# Patient Record
Sex: FEMALE | Race: WHITE | NOT HISPANIC OR LATINO | URBAN - METROPOLITAN AREA
[De-identification: names, ages, dates, MRNs, and addresses within clinical notes are randomized per-mention and may not be internally consistent; named-entity substitution may affect disease eponyms.]

---

## 2016-09-08 RX ORDER — LEVOTHYROXINE SODIUM 125 MCG
1 TABLET ORAL
Qty: 0 | Refills: 0 | COMMUNITY
Start: 2016-09-08

## 2017-04-30 ENCOUNTER — INPATIENT (INPATIENT)
Facility: HOSPITAL | Age: 82
LOS: 1 days | Discharge: HOME | DRG: 149 | End: 2017-05-02
Attending: INTERNAL MEDICINE | Admitting: INTERNAL MEDICINE
Payer: MEDICARE

## 2017-04-30 VITALS
TEMPERATURE: 97 F | HEART RATE: 103 BPM | RESPIRATION RATE: 18 BRPM | DIASTOLIC BLOOD PRESSURE: 84 MMHG | WEIGHT: 109.79 LBS | SYSTOLIC BLOOD PRESSURE: 150 MMHG

## 2017-04-30 DIAGNOSIS — I95.9 HYPOTENSION, UNSPECIFIED: ICD-10-CM

## 2017-04-30 DIAGNOSIS — I48.91 UNSPECIFIED ATRIAL FIBRILLATION: ICD-10-CM

## 2017-04-30 DIAGNOSIS — Z98.89 OTHER SPECIFIED POSTPROCEDURAL STATES: Chronic | ICD-10-CM

## 2017-04-30 DIAGNOSIS — E03.9 HYPOTHYROIDISM, UNSPECIFIED: ICD-10-CM

## 2017-04-30 DIAGNOSIS — M25.472 EFFUSION, LEFT ANKLE: ICD-10-CM

## 2017-04-30 DIAGNOSIS — I50.30 UNSPECIFIED DIASTOLIC (CONGESTIVE) HEART FAILURE: ICD-10-CM

## 2017-04-30 DIAGNOSIS — R42 DIZZINESS AND GIDDINESS: ICD-10-CM

## 2017-04-30 LAB
ALBUMIN SERPL ELPH-MCNC: 4 G/DL — SIGNIFICANT CHANGE UP (ref 3.4–5)
ALP SERPL-CCNC: 59 U/L — SIGNIFICANT CHANGE UP (ref 40–120)
ALT FLD-CCNC: 23 U/L — SIGNIFICANT CHANGE UP (ref 12–42)
ANION GAP SERPL CALC-SCNC: 5 MMOL/L — LOW (ref 9–16)
AST SERPL-CCNC: 20 U/L — SIGNIFICANT CHANGE UP (ref 15–37)
BASOPHILS NFR BLD AUTO: 0.4 % — SIGNIFICANT CHANGE UP (ref 0–2)
BILIRUB SERPL-MCNC: 0.4 MG/DL — SIGNIFICANT CHANGE UP (ref 0.2–1.2)
BUN SERPL-MCNC: 15 MG/DL — SIGNIFICANT CHANGE UP (ref 7–23)
CALCIUM SERPL-MCNC: 9 MG/DL — SIGNIFICANT CHANGE UP (ref 8.5–10.5)
CHLORIDE SERPL-SCNC: 104 MMOL/L — SIGNIFICANT CHANGE UP (ref 96–108)
CK MB CFR SERPL CALC: <1 NG/ML — SIGNIFICANT CHANGE UP (ref 0.5–3.6)
CK SERPL-CCNC: 36 U/L — SIGNIFICANT CHANGE UP (ref 26–192)
CLOSURE TME COLL+EPINEP BLD: 125 K/UL — LOW (ref 150–400)
CO2 SERPL-SCNC: 30 MMOL/L — SIGNIFICANT CHANGE UP (ref 22–31)
CREAT SERPL-MCNC: 0.8 MG/DL — SIGNIFICANT CHANGE UP (ref 0.5–1.3)
EOSINOPHIL NFR BLD AUTO: 0.4 % — SIGNIFICANT CHANGE UP (ref 0–6)
EXTRA BLUE TOP TUBE: SIGNIFICANT CHANGE UP
EXTRA SST TUBE: SIGNIFICANT CHANGE UP
GLUCOSE SERPL-MCNC: 105 MG/DL — HIGH (ref 70–99)
HCT VFR BLD CALC: 44.6 % — SIGNIFICANT CHANGE UP (ref 34.5–45)
HGB BLD-MCNC: 15.5 G/DL — SIGNIFICANT CHANGE UP (ref 11.5–15.5)
LYMPHOCYTES # BLD AUTO: 31 % — SIGNIFICANT CHANGE UP (ref 13–44)
MCHC RBC-ENTMCNC: 30.1 PG — SIGNIFICANT CHANGE UP (ref 27–34)
MCHC RBC-ENTMCNC: 34.8 G/DL — SIGNIFICANT CHANGE UP (ref 32–36)
MCV RBC AUTO: 86.6 FL — SIGNIFICANT CHANGE UP (ref 80–100)
MONOCYTES NFR BLD AUTO: 7.1 % — SIGNIFICANT CHANGE UP (ref 2–14)
NEUTROPHILS NFR BLD AUTO: 61.1 % — SIGNIFICANT CHANGE UP (ref 43–77)
NT-PROBNP SERPL-SCNC: 632 PG/ML — HIGH
PLATELET # BLD AUTO: 23 K/UL — LOW (ref 150–400)
POTASSIUM SERPL-MCNC: 4.3 MMOL/L — SIGNIFICANT CHANGE UP (ref 3.5–5.3)
POTASSIUM SERPL-SCNC: 4.3 MMOL/L — SIGNIFICANT CHANGE UP (ref 3.5–5.3)
PROT SERPL-MCNC: 8.1 G/DL — SIGNIFICANT CHANGE UP (ref 6.4–8.2)
RBC # BLD: 5.15 M/UL — SIGNIFICANT CHANGE UP (ref 3.8–5.2)
RBC # FLD: 13.5 % — SIGNIFICANT CHANGE UP (ref 10.3–16.9)
SODIUM SERPL-SCNC: 139 MMOL/L — SIGNIFICANT CHANGE UP (ref 135–145)
TROPONIN I SERPL-MCNC: 0.12 NG/ML — HIGH (ref 0.01–0.04)
TROPONIN I SERPL-MCNC: 0.12 NG/ML — HIGH (ref 0.01–0.04)
WBC # BLD: 4.7 K/UL — SIGNIFICANT CHANGE UP (ref 3.8–10.5)
WBC # FLD AUTO: 4.7 K/UL — SIGNIFICANT CHANGE UP (ref 3.8–10.5)

## 2017-04-30 PROCEDURE — 71010: CPT | Mod: 26

## 2017-04-30 PROCEDURE — 99285 EMERGENCY DEPT VISIT HI MDM: CPT | Mod: 25

## 2017-04-30 PROCEDURE — 70450 CT HEAD/BRAIN W/O DYE: CPT | Mod: 26

## 2017-04-30 PROCEDURE — 93010 ELECTROCARDIOGRAM REPORT: CPT

## 2017-04-30 RX ORDER — HEPARIN SODIUM 5000 [USP'U]/ML
5000 INJECTION INTRAVENOUS; SUBCUTANEOUS EVERY 8 HOURS
Qty: 0 | Refills: 0 | Status: DISCONTINUED | OUTPATIENT
Start: 2017-04-30 | End: 2017-04-30

## 2017-04-30 RX ORDER — SODIUM CHLORIDE 9 MG/ML
1000 INJECTION INTRAMUSCULAR; INTRAVENOUS; SUBCUTANEOUS
Qty: 0 | Refills: 0 | Status: DISCONTINUED | OUTPATIENT
Start: 2017-04-30 | End: 2017-04-30

## 2017-04-30 RX ORDER — ESTROGENS, CONJUGATED 0.625 MG
0.3 TABLET ORAL DAILY
Qty: 0 | Refills: 0 | Status: DISCONTINUED | OUTPATIENT
Start: 2017-04-30 | End: 2017-05-02

## 2017-04-30 RX ORDER — LEVOTHYROXINE SODIUM 125 MCG
25 TABLET ORAL DAILY
Qty: 0 | Refills: 0 | Status: DISCONTINUED | OUTPATIENT
Start: 2017-04-30 | End: 2017-05-01

## 2017-04-30 RX ORDER — APIXABAN 2.5 MG/1
2.5 TABLET, FILM COATED ORAL
Qty: 0 | Refills: 0 | Status: DISCONTINUED | OUTPATIENT
Start: 2017-04-30 | End: 2017-05-02

## 2017-04-30 RX ORDER — ONDANSETRON 8 MG/1
4 TABLET, FILM COATED ORAL ONCE
Qty: 0 | Refills: 0 | Status: COMPLETED | OUTPATIENT
Start: 2017-04-30 | End: 2017-04-30

## 2017-04-30 RX ADMIN — APIXABAN 2.5 MILLIGRAM(S): 2.5 TABLET, FILM COATED ORAL at 22:54

## 2017-04-30 RX ADMIN — ONDANSETRON 4 MILLIGRAM(S): 8 TABLET, FILM COATED ORAL at 16:36

## 2017-04-30 RX ADMIN — Medication 0.3 MILLIGRAM(S): at 22:54

## 2017-04-30 RX ADMIN — SODIUM CHLORIDE 200 MILLILITER(S): 9 INJECTION INTRAMUSCULAR; INTRAVENOUS; SUBCUTANEOUS at 16:36

## 2017-04-30 NOTE — H&P ADULT - NSHPLABSRESULTS_GEN_ALL_CORE
Temp 98.6F, HR 73bpm, /60, RR 16, O2 sat 96% RA    Vital Signs Last 24 Hrs  T(C): 36.6, Max: 36.6 (04-30 @ 18:58)  T(F): 97.8, Max: 97.8 (04-30 @ 18:58)  HR: 73 (73 - 103)  BP: 123/60 (123/60 - 168/75)  BP(mean): --  RR: 15 (15 - 18)  SpO2: 96% (96% - 97%)                          15.5   4.7   )-----------( 23       ( 30 Apr 2017 16:33 )             44.6       04-30    139  |  104  |  15  ----------------------------<  105<H>  4.3   |  30  |  0.80    Ca    9.0      30 Apr 2017 16:33    TPro  8.1  /  Alb  4.0  /  TBili  0.4  /  DBili  x   /  AST  20  /  ALT  23  /  AlkPhos  59  04-30          CARDIAC MARKERS ( 30 Apr 2017 16:33 )  0.115 ng/mL / x     / 36 U/L / x     / <1.0 ng/mL

## 2017-04-30 NOTE — H&P ADULT - PROBLEM SELECTOR PLAN 2
-Currently in sinus rhythm, HR 70s-80s  -Previously on metoprolol XL 25mg daily, per patient it was discontinued  -[facility] [phone] closed Sunday, follow up confirmation of meds  -s/p DCCV failed, but returned to sinus rhythm with amiodarone therapy afterwards  -Continue Eliquis 2.5mg BID

## 2017-04-30 NOTE — ED ADULT TRIAGE NOTE - CHIEF COMPLAINT QUOTE
2 syncope over past few days - given medicine over phone for vertigo - not helping - associated nausea ; states continues to feel dizzy denies pain or SOB

## 2017-04-30 NOTE — ED PROVIDER NOTE - ATTENDING CONTRIBUTION TO CARE
87 y/o female h/o  htn, afib and chf, hyperlipidemia, hypothyroid c/o dizziness (room spinning) x 1 wk.  No relief w meclizine  No associated ha, cp, sob, n/v/d, syncope,  visual changes, numbness/weakness in ext,  fevers, chills.  Anxious, nc/at, perrl, eomi, lung cta, heart reg, abd soft/nt, ext no c/c/e, cn grossly intact, motor 5/5, no gross sens deficits, nl gait.  Suspect vertigo, no cp to suggest acs.  EKG w/o stemi.  Pt felt improved w valium.  Labs w elevated trop, unchanged head ct; pt discussed w cardiology - will admit for r/o.

## 2017-04-30 NOTE — H&P ADULT - PROBLEM SELECTOR PLAN 1
-Pt appears dizzier with ambulation  -Fall precautions, OOB w/ assist only  -Troponin 0.115, f/u trop @ 10 and in AM  - echo in AM, carotid US ordered for AM- discuss with Dr Toledo in AM  -EKG NSR no acute changes  -CT head negative for acute pathology

## 2017-04-30 NOTE — H&P ADULT - PROBLEM SELECTOR PLAN 3
-Mild rales on exam, O2 97% on RA, CXR-cardiomegaly neg for pulmonary congestion  -TRIXIE (9/2016)EF 55%, normal LV motion, mild-moderate AR/ MR, moderate atherosclerotic plaque in descending aorta.  - F/u Echo in AM -Mild rales on exam, O2 97% on RA, CXR-cardiomegaly neg for pulmonary congestion  -TRIXIE (9/2016)EF 55%, normal LV motion, mild-moderate AR/ MR, moderate atherosclerotic plaque in descending aorta.  - F/u Echo in AM  -

## 2017-04-30 NOTE — H&P ADULT - HISTORY OF PRESENT ILLNESS
85 year old female, occasional marijuana user,  with pmHx afib (on Eliquis, s/p DCCV with amiodarone therapy afterwards), hypothyroidism, hypotension, vasovagal syncope, mitral valve repair in 2004, recently hospitalized @ [facility] (September 2016) for acute decompensated diastolic heart failure at which time patient was started on amiodarone loading with plans to bring patient back for DCCV within one week, however patient reports with medication she returned to sinus rhythm presented to ED today (4/30/17) complaining of increasing dizziness and palpitations for the last week. Pt reports she saw her cardiologist, Dr Mejias, this Wednesday for similar symptoms and was found to be dehydrated with a normal EKG and was sent home. Pt states she went home that night to eat dinner and as she bent over to eat dinner felt weak and dizzy with the room spinning and slowly lowered herself to the floor with no LOC or head trauma. Pt called, Dr Mejias the next day  Dr Mejias, who called in prescription meclizine that the patient took with no relief. In ED, /80, , T 97.2, Platelets 23 (called for manual count, will follow), RR 18, O2..?, Trop 0.1, EKG    Zofran, valium, CT head (4/30/17) No appreciable interval change.No hydrocephalus, midline shift, acute intracranial hemorrhage or demarcated territorial infarct.   TRIXIE (9/2016): EF 55%, normal LV motion, mild-moderate AR/ MR, moderate atherosclerotic plaque in descending aorta.  MRA chest(9/16/2016) No aortic aneurysm or dissection.Cardiomegaly. Status post mitral valve replacement. Small to moderate-sized bilateral pleural effusions. 85 year old female, occasional marijuana user,  with pmHx afib (on Eliquis, s/p DCCV with amiodarone therapy afterwards), hypothyroidism, hypotension, vasovagal syncope, mitral valve repair in 2004, recently hospitalized @ [facility] (September 2016) for acute decompensated diastolic heart failure at which time patient was started on amiodarone loading with plans to bring patient back for DCCV within one week, however patient reports with medication she returned to sinus rhythm presented to ED today (4/30/17) complaining of increasing dizziness and palpitations for the last week. Pt reports she saw her cardiologist, Dr Mejias, this Wednesday for similar symptoms and was found to be dehydrated with a normal EKG and was sent home. Pt states she went home that night to eat dinner and as she bent over to eat dinner felt weak and dizzy with the room spinning and slowly lowered herself to the floor with no LOC or head trauma. Pt called, Dr Mejias the next day and she called in meclizine for possible vertigo symptoms and patient still felt unsteady and weak when getting out of bed yesterday with associated nausea. Today she spoke with Dr Mejias's covering physician who told her to go to the ER to be further evaluated. Pt admits to SOB when walking 3 blocks, has a chronically left swollen ankle that has been evaluated by ultrasound. In ED, /80, , T 97.2, Platelets 23 (called for manual count, will follow), RR 18, O2 97% on RA, Trop 0.1, EKG NSR, no acute changes.  Zofran 4mg x1, valium 2mg x 1, CT head (4/30/17) No appreciable interval change. No hydrocephalus, midline shift, acute intracranial hemorrhage or demarcated territorial infarct. Denies CP, palpitations, vomiting, diarrhea, recent fevers/chills, melena, hematochezia.   TRIXIE (9/2016): EF 55%, normal LV motion, mild-moderate AR/ MR, moderate atherosclerotic plaque in descending aorta.  MRA chest(9/16/2016) No aortic aneurysm or dissection.Cardiomegaly. Status post mitral valve replacement. Small to moderate-sized bilateral pleural effusions. 85 year old female, occasional marijuana user,  with pmHx afib (on Eliquis, s/p DCCV with amiodarone therapy afterwards), hypothyroidism, hypotension, vasovagal syncope, mitral valve repair in 2004, recently hospitalized @ [facility] (September 2016) for acute decompensated diastolic heart failure at which time patient was started on amiodarone loading with plans to bring patient back for DCCV within one week, however patient reports with medication she returned to sinus rhythm presented to ED today (4/30/17) complaining of increasing dizziness and palpitations for the last week. Pt reports she saw her cardiologist, Dr Mejias, this Wednesday for similar symptoms and was found to be dehydrated with a normal EKG and was sent home. Pt states she went home that night to eat dinner and as she bent over to eat dinner felt weak and dizzy with the room spinning and slowly lowered herself to the floor with no LOC or head trauma. Pt called, Dr Mejias the next day and she called in meclizine for possible vertigo symptoms and patient still felt unsteady and weak when getting out of bed yesterday with associated nausea. Today she spoke with Dr Mejias's covering physician who told her to go to the ER to be further evaluated. Pt admits to SOB when walking 3 blocks, has a chronically left swollen ankle that has been evaluated by ultrasound. In ED, /80, , T 97.2, Platelets 23 (called for manual count, will follow), RR 18, O2 97% on RA, CXR cardiomegaly no pulmonary congestion, Trop 0.1, EKG NSR, no acute changes.  Zofran 4mg x1, valium 2mg x 1, CT head (4/30/17) No appreciable interval change. No hydrocephalus, midline shift, acute intracranial hemorrhage or demarcated territorial infarct. Denies CP, palpitations, vomiting, diarrhea, recent fevers/chills, melena, hematochezia. Pt is admitted to [facility] for further cardiac management  TRIXIE (9/2016): EF 55%, normal LV motion, mild-moderate AR/ MR, moderate atherosclerotic plaque in descending aorta.  MRA chest(9/16/2016) No aortic aneurysm or dissection.Cardiomegaly. Status post mitral valve replacement. Small to moderate-sized bilateral pleural effusions.

## 2017-04-30 NOTE — ED ADULT NURSE NOTE - OBJECTIVE STATEMENT
pt to ER w/ report of several recent near-syncopal episodes, most recently last night.  Pt denies LOC, denies cp/sob/f/c.  Pt reports intermittent dizziness, nausea, and disequilibrium.  Breathing unlabored, skin warm and dry. IV access established, labs drawn and sent. IV fluids and meds given per PA order.  Will continue to monitor.

## 2017-04-30 NOTE — H&P ADULT - PROBLEM SELECTOR PLAN 6
-Pt reports chronic swelling in left ankle for years, states she has had ultrasounds on it, denies pain or increase in swelling recently.

## 2017-04-30 NOTE — ED PROVIDER NOTE - OBJECTIVE STATEMENT
The pt is a 87 y/o F, who presents to ED c/o feeling dizzy x 1 wk. Pt states that "everything is spinning", is afraid that will fall, has been taking meclizine w/o relief. Denies cp, sob, n/v/d, syncope, h/a, visual changes, fevers, chills The pt is a 87 y/o F, who presents to ED c/o feeling dizzy x 1 wk. Pt states that "everything is spinning", is afraid that will fall, has been taking meclizine w/o relief, dizziness not related to position changes and feels "like I may pass out". PMH of htn, afib and chf, hyperlipidemia, hypothyroid. Denies cp, sob, n/v/d, syncope, h/a, visual changes, fevers, chills

## 2017-04-30 NOTE — ED PROVIDER NOTE - CARE PLAN
Principal Discharge DX:	Elevated troponin  Secondary Diagnosis:	Dizziness  Secondary Diagnosis:	A-fib

## 2017-04-30 NOTE — ED PROVIDER NOTE - ENMT, MLM
Airway patent, Nasal mucosa clear. Mouth with normal mucosa. Throat has no vesicles, no oropharyngeal exudates and uvula is midline. ears clear b/l

## 2017-04-30 NOTE — H&P ADULT - NSHPPHYSICALEXAM_GEN_ALL_CORE
Appearance:  Anxious  Neck: Supple,  - JVD; No Carotid Bruit and 2+ pulses B/L  Cardiovascular: + Mitral murmur, Normal S1 S2, No JVD  Respiratory: + rhonchi mid to bases b/l, No  Rhonchi, Wheezing	  Gastrointestinal:  Soft, Non-tender, + BS  Skin: No rashes, No ecchymoses, No cyanosis  Extremities: +chronic left ankle swelling,  Normal range of motion, No clubbing, cyanosis or edema  Vascular: Femoral pulses 2+ b/l without bruit, DP 2+ b/l, PT faint b/l  Neurologic: Non-focal  Psychiatry: A & O x 3, Mood & affect appropriate Appearance:  Anxious  Neck: Supple,  - JVD; No Carotid Bruit and 2+ pulses B/L  Cardiovascular: + Mitral murmur, Normal S1 S2, No JVD  Respiratory: + rhonchi mid to bases b/l, No  Rhonchi, Wheezing	  Gastrointestinal:  Soft, Non-tender, + BS  Skin: No rashes, No ecchymoses, No cyanosis  Extremities: +chronic left ankle swelling +1 nonpitting,  Normal range of motion, No clubbing, cyanosis or edema  Vascular: Femoral pulses 2+ b/l without bruit, DP 2+ b/l, PT faint b/l  Neurologic: Non-focal  Psychiatry: A & O x 3, Mood & affect appropriate

## 2017-04-30 NOTE — ED PROVIDER NOTE - MEDICAL DECISION MAKING DETAILS
pt w/dizziness x 1 wk, feels like may pass out, hx of afib / chf /htn/ hypothyroid, has not f/u w/cards in "a while", ekg nsr w/pvcs, however + trop, non focal neuro exam, ct head neg, cxr clear, will admit to cards tele for echo and serial trop / r/o arrhythmia r/o acs, pt hemodynamically stable, did have relief of dizziness w/dose of valium in ed

## 2017-04-30 NOTE — H&P ADULT - ATTENDING COMMENTS
PATIENT SEEN AND EXAMINED.  AGREE WITH ABOVE.  CONFIRMED WITH PATIENT.  IN ADDITION, SHE HAS HAD SYNCOPE 4-5 TIMES IN THE PAST MANY YEARS AND HAS HER CARDIOLOGIST AT [facility] THAT FOLLOWS HER.  OF NOTE, SHE HAD A LOW BP THIS PAST WEDNESDAY AND WAS TOLD TO INCREASE SALT AND ANY MEDS THAT LOWERED BP WERE STOPPED.  THERE IS ALSO SOME CONCERN THAT SHE HAS VERTIGO AND WAS SEEN BY AN OUT PATIENT ENT.      FEELS BETTER THIS MORNING.      EKG: NSR, NO ISCHEMIA  L: PLAT 32 (MANUAL COUTN 123)    IMPRESSION:  - DIZZY  - AFIB  - LOW PLATS    REC:  - HOLD ALL ANTI-HTN  - EP EVAL, CONSIDER ILR  - HEME EVAL FOR LOW PLATS  - PT/SW EVAL - PLAN D/C TOMORROW

## 2017-05-01 ENCOUNTER — TRANSCRIPTION ENCOUNTER (OUTPATIENT)
Age: 82
End: 2017-05-01

## 2017-05-01 DIAGNOSIS — D69.6 THROMBOCYTOPENIA, UNSPECIFIED: ICD-10-CM

## 2017-05-01 PROCEDURE — 99223 1ST HOSP IP/OBS HIGH 75: CPT

## 2017-05-01 RX ORDER — ACETAMINOPHEN 500 MG
650 TABLET ORAL ONCE
Qty: 0 | Refills: 0 | Status: COMPLETED | OUTPATIENT
Start: 2017-05-01 | End: 2017-05-01

## 2017-05-01 RX ORDER — ZALEPLON 10 MG
5 CAPSULE ORAL AT BEDTIME
Qty: 0 | Refills: 0 | Status: DISCONTINUED | OUTPATIENT
Start: 2017-05-01 | End: 2017-05-02

## 2017-05-01 RX ORDER — LEVOTHYROXINE SODIUM 125 MCG
50 TABLET ORAL DAILY
Qty: 0 | Refills: 0 | Status: DISCONTINUED | OUTPATIENT
Start: 2017-05-01 | End: 2017-05-02

## 2017-05-01 RX ORDER — POTASSIUM CHLORIDE 20 MEQ
20 PACKET (EA) ORAL ONCE
Qty: 0 | Refills: 0 | Status: COMPLETED | OUTPATIENT
Start: 2017-05-01 | End: 2017-05-01

## 2017-05-01 RX ADMIN — Medication 650 MILLIGRAM(S): at 02:17

## 2017-05-01 RX ADMIN — Medication 25 MICROGRAM(S): at 05:17

## 2017-05-01 RX ADMIN — Medication 20 MILLIEQUIVALENT(S): at 08:42

## 2017-05-01 RX ADMIN — APIXABAN 2.5 MILLIGRAM(S): 2.5 TABLET, FILM COATED ORAL at 05:18

## 2017-05-01 RX ADMIN — Medication 5 MILLIGRAM(S): at 22:59

## 2017-05-01 RX ADMIN — Medication 0.3 MILLIGRAM(S): at 21:20

## 2017-05-01 RX ADMIN — Medication 650 MILLIGRAM(S): at 03:17

## 2017-05-01 RX ADMIN — APIXABAN 2.5 MILLIGRAM(S): 2.5 TABLET, FILM COATED ORAL at 21:20

## 2017-05-01 NOTE — PROGRESS NOTE ADULT - PROBLEM SELECTOR PLAN 2
Currently in NSR  71 bpm on telemetry.   - EP consult (Dr. Menchaca following) recommended loop recorder/amio/BB, patient refusing all  - Per EP, c/w Eliquis 2.5mg BID as stroke risk outweighing risk of bleeding with low platelet count.

## 2017-05-01 NOTE — DISCHARGE NOTE ADULT - CARE PROVIDER_API CALL
alissa ochoa  Address: [address] [place] [address]  Phone: [phone]  Phone: (   )    -  Fax: (   )    -    Eyad Menchaca), Cardiac Electrophysiology; Cardiovascular Disease; Internal Medicine  [address] [address]  Phone: [phone]  Fax: [phone]    Mitzi Kyle), Medicine  [address] [place] [address]  Phone: [phone]  Fax: [phone] Eyad Menchaca), Cardiac Electrophysiology; Cardiovascular Disease; Internal Medicine  [address] [address]  Phone: [phone]  Fax: [phone]    Mitzi Kyle), Medicine  [address] [place] [address]  Phone: [phone]  Fax: [phone]    goldberg, neica  Address: [address] [place] [address]  Phone: [phone]  Phone: (   )    -  Fax: (   )    -

## 2017-05-01 NOTE — DISCHARGE NOTE ADULT - PLAN OF CARE
You came to the hospital with dizziness and had a CT scan of your head which was negative as was the Echocardiogram of your heart and Ultrasound of your Carotid Arteries in your neck. Follow up with your Cardiologist Dr. Mejias at [facility] in 1-2 weeks. You have an irregular heart rhythm and need to continue taking your Eliquis as prescribed 2.5mg twice a day. Follow up with your Cardiologist Dr. Mejias at [facility] in 1-2 weeks as well as Dr. Menchaca [address], [address] [phone]. Continue your Synthroid 50mcg daily You have a low platelet count and Dr. Kyle would like to see you in her office in 1 week to follow up. Dr. Kyle office is at [address] [place] [address] 3rd floor ([phone]) Follow up with your Cardiologist Dr. Dr. Goldberg at [facility] in 1-2 weeks. Follow up with your Cardiologist Dr. Goldberg at [facility] in 1-2 weeks as well as Dr. Menchaca [address] [phone].

## 2017-05-01 NOTE — PROGRESS NOTE ADULT - ASSESSMENT
84yo F, occasional marijuana user, with PMHx afib (on Eliquis, s/p failed DCCV converted to NSR with amiodarone therapy afterwards), hypothyroidism, hypotension, vasovagal syncope, mitral valve repair in 2004, diastolic CHF (EF 55%, recent hospitalization 9/16, mild-moderate AR/MR) who presented to ED (4/30/17) complaining of increasing dizziness and palpitations for the last week. Pt is admitted to [facility] for further cardiac management.

## 2017-05-01 NOTE — DISCHARGE NOTE ADULT - MEDICATION SUMMARY - MEDICATIONS TO STOP TAKING
I will STOP taking the medications listed below when I get home from the hospital:    amiodarone 200 mg oral tablet  -- 1 tab(s) by mouth every 12 hours, until september 17. After that please take one tablet daily.    Toprol-XL 25 mg oral tablet, extended release  -- 1 tab(s) by mouth once a day (at bedtime.  -- It is very important that you take or use this exactly as directed.  Do not skip doses or discontinue unless directed by your doctor.  May cause drowsiness.  Alcohol may intensify this effect.  Use care when operating dangerous machinery.  Some non-prescription drugs may aggravate your condition.  Read all labels carefully.  If a warning appears, check with your doctor before taking.  Swallow whole.  Do not crush.  Take with food or milk.  This drug may impair the ability to drive or operate machinery.  Use care until you become familiar with its effects.    furosemide 20 mg oral tablet  -- 1 tab(s) by mouth once a day in the morning  -- Avoid prolonged or excessive exposure to direct and/or artificial sunlight while taking this medication.  It is very important that you take or use this exactly as directed.  Do not skip doses or discontinue unless directed by your doctor.  It may be advisable to drink a full glass orange juice or eat a banana daily while taking this medication.

## 2017-05-01 NOTE — DISCHARGE NOTE ADULT - PROVIDER TOKENS
FREE:[LAST:[gabriela],FIRST:[alissa],PHONE:[(   )    -],FAX:[(   )    -],ADDRESS:[Address: [address] [place] [address]  Phone: [phone]]],TOKEN:'[id number]',TOKEN:'[id number]' TOKEN:'[id number]:'[id number]',FREE:[LAST:[goldberg],FIRST:[tanvir],PHONE:[(   )    -],FAX:[(   )    -],ADDRESS:[Address: [address] [place] [address]  Phone: [phone]]]

## 2017-05-01 NOTE — PROGRESS NOTE ADULT - PROBLEM SELECTOR PLAN 1
Denies current dizziness, No events on telemetry overnight.   - Head CT negative for pathology.   - Troponin trend 0.115->0.121->0.129  - follow up echo  - follow up b/l carotid duplex  - PT eval considering age

## 2017-05-01 NOTE — DISCHARGE NOTE ADULT - OTHER SIGNIFICANT FINDINGS
patient seen and examined.  agree with above. EP input appreciated - recommend eliquis.  heme follow up for thrombocytopenia.    out patient follow up with primary cardiology

## 2017-05-01 NOTE — CONSULT NOTE ADULT - ASSESSMENT
84 y/o F with pmHx hypothyroidism, hypotension, vasovagal syncope, mitral valve repair in 2004, AFIB with rapid ventricular response episode in 9/2016 treated with cardioversion. She is here for weakness / near-syncope last week.  - She has had extensive cardiac monitor in Feb that had unremarkable finding despite her persistent symptoms.  Unclear if presenting symptoms this time is arrhythmic related vs possible hypotension. Current telemetry while here continues to NSR.  She is reluctant to consider any loop recorder implant. No further intervention from EPS standpoint. Continue Eliquis for stroke prevention. Will d/w Dr. Menchaca.

## 2017-05-01 NOTE — DISCHARGE NOTE ADULT - MEDICATION SUMMARY - MEDICATIONS TO TAKE
I will START or STAY ON the medications listed below when I get home from the hospital:    Eliquis 2.5 mg oral tablet  -- 1 tab(s) by mouth 2 times a day  -- Indication: For A-fib    flurazepam 15 mg oral capsule  -- 1 cap(s) by mouth once a day (at bedtime)  -- Indication: For Anxiety    Premarin 0.3 mg oral tablet  -- 1 tab(s) by mouth once a day  -- Indication: For Estrogen    levothyroxine 25 mcg (0.025 mg) oral tablet  -- 1 tab(s) by mouth 2 times a day  -- Indication: For Hypothyroidism

## 2017-05-01 NOTE — DISCHARGE NOTE ADULT - PATIENT PORTAL LINK FT
“You can access the [facility] Patient Portal, offered by [facility], by registering with the following website: [email or web address]”

## 2017-05-01 NOTE — CONSULT NOTE ADULT - SUBJECTIVE AND OBJECTIVE BOX
HISTORY OF PRESENT ILLNESS:   85 year old female, occasional marijuana user,  with pmHx afib (on Eliquis, s/p DCCV with amiodarone therapy afterwards), hypothyroidism, hypotension, vasovagal syncope, mitral valve repair in 2004, AFIB with rapid ventricular response episode in 9/2016 with presentation of near-syncope, weakness and malaise. She was treated with rate slowing and anticoagulation. She underwent TRIXIE without LA/JINA thrombus, given Amiodarone and had DCCV. She was on multiple medicatiosn after the cardioversion including metoprolol, lasix, amiodarone and eliquis. She has discontinued all but the eliquis as she has complained of intolerance to all the drugs with complaints of GI di(a chronic problem)    recently hospitalized @ [facility] (September 2016) for acute decompensated diastolic heart failure at which time patient was started on amiodarone loading with plans to bring patient back for DCCV within one week, however patient reports with medication she returned to sinus rhythm presented to ED today (4/30/17) complaining of increasing dizziness and palpitations for the last week. Pt reports she saw her cardiologist, Dr Mejias, this Wednesday for similar symptoms and was found to be dehydrated with a normal EKG and was sent home. Pt states she went home that night to eat dinner and as she bent over to eat dinner felt weak and dizzy with the room spinning and slowly lowered herself to the floor with no LOC or head trauma. Pt called, Dr Mejias the next day and [facility] and patient still felt unsteady and weak when getting out of bed yesterday with associated nausea. Today she spoke with Dr Mejias's covering physician who told her to go to the ER to be further evaluated. Pt admits to SOB when walking 3 blocks, has a chronically left swollen ankle that has been evaluated by ultrasound. In ED, /80, , T 97.2, Platelets 23 (called for manual count, will follow), RR 18, O2 97% on RA, CXR cardiomegaly no pulmonary congestion, Trop 0.1, EKG NSR, no acute changes.  Zofran 4mg x1, valium 2mg x 1, CT head (4/30/17) No appreciable interval change. No hydrocephalus, midline shift, acute intracranial hemorrhage or demarcated territorial infarct. Denies CP, palpitations, vomiting, diarrhea, recent fevers/chills, melena, hematochezia. Pt is [email or web address] for further cardiac management  TRIXIE (9/2016): EF 55%, normal LV motion, mild-moderate AR/ MR, moderate atherosclerotic plaque in descending aorta.  MRA chest(9/16/2016) No aortic aneurysm or dissection.Cardiomegaly. Status post mitral valve replacement. Small to moderate-sized bilateral pleural effusions. (30 Apr 2017 20:26)          PAST MEDICAL AND SURGICAL HISTORY:  PAST MEDICAL & SURGICAL HISTORY:  Vasovagal syncope  Migraines  Hypotension  Hypothyroidism  A-fib  H/O mitral valve repair      FAMILY HISTORY: FAMILY HISTORY:      SOCIAL HISTORY: Occupation:  [employer]: Denied  Smoking: Denied  Drug Use: Denied  Marital Status:         REVIEW OF SYSTEM: REVIEW OF SYSTEMS:    CONSTITUTIONAL: No fever, weight loss, or fatigue  EYES: No eye pain, visual disturbances, or discharge  ENMT:  No difficulty hearing, tinnitus, vertigo; No sinus or throat pain  NECK: No pain or stiffness  BREASTS: No pain, masses, or nipple discharge  RESPIRATORY: No cough, wheezing, chills or hemoptysis; No shortness of breath  CARDIOVASCULAR: No chest pain, palpitations, dizziness, or leg swelling  GASTROINTESTINAL: No abdominal or epigastric pain. No nausea, vomiting, or hematemesis; No diarrhea or constipation. No melena or hematochezia.  GENITOURINARY: No dysuria, frequency, hematuria, or incontinence  NEUROLOGICAL: No headaches, memory loss, loss of strength, numbness, or tremors  SKIN: No itching, burning, rashes, or lesions   LYMPH NODES: No enlarged glands  ENDOCRINE: No heat or cold intolerance; No hair loss  MUSCULOSKELETAL: No joint pain or swelling; No muscle, back, or extremity pain  PSYCHIATRIC: No depression, anxiety, mood swings, or difficulty sleeping  HEME/LYMPH: No easy bruising, or bleeding gums  ALLERGY AND IMMUNOLOGIC: No hives or eczema      ALLERGY:  latex (Other)  Orange Juice (Other)  penicillin (Angioedema)      INPATIENT MEDICATIONS:  apixaban 2.5milliGRAM(s) Oral two times a day  levothyroxine 25MICROGram(s) Oral daily  estrogens    conjugated 0.3milliGRAM(s) Oral daily      VITAL SIGNS:   T(C): 36.1, Max: 36.6 (04-30 @ 18:58)  HR: 54 (54 - 103)  BP: 133/64 (113/53 - 168/75)  RR: 16 (15 - 18)  SpO2: 98% (96% - 98%)  Wt(kg): --    PHYSICAL EXAM:   Appearance:   CVS:   Pulm:   Abd:  Soft, NT/ND, + BS	  Ext: No edema    LABS:                        14.6   4.0   )-----------( x        ( 01 May 2017 11:04 )             43.7     05-01    141  |  106  |  15  ----------------------------<  77  3.8   |  26  |  0.77    Ca    8.0<L>      01 May 2017 06:32  Mg     2.0     05-01    TPro  8.1  /  Alb  4.0  /  TBili  0.4  /  DBili  x   /  AST  20  /  ALT  23  /  AlkPhos  59  04-30      TSH  TroponinTroponin I, Serum: 0.129 ng/mL (05-01 @ 06:32)  Troponin I, Serum: 0.121 ng/mL (04-30 @ 20:25)  Troponin I, Serum: 0.115 ng/mL (04-30 @ 16:33)     LIVER FUNCTIONS - ( 30 Apr 2017 16:33 )  Alb: 4.0 g/dL / Pro: 8.1 g/dL / ALK PHOS: 59 U/L / ALT: 23 U/L / AST: 20 U/L / GGT: x             EKG:    Telemetry:    ECHO: HISTORY OF PRESENT ILLNESS:   85 year old female, occasional marijuana user,  with pmHx afib (on Eliquis, s/p DCCV with amiodarone therapy afterwards), hypothyroidism, hypotension, vasovagal syncope, mitral valve repair in 2004, AFIB with rapid ventricular response episode in 9/2016 with presentation of near-syncope, weakness and malaise. She was treated with rate slowing and anticoagulation. She underwent TRIXIE without LA/JINA thrombus, given Amiodarone and had DCCV. She was on multiple medicatiosn after the cardioversion including metoprolol, lasix, amiodarone and eliquis. She has discontinued all but the eliquis as she has complained of intolerance to all the drugs with complaints of GI discomfort (a chronic problem) and dizziness (also a chronic problem). She continued to have complaints of palpitations / dizziness and her outpatient 2-week Heart Monitor in Feb 2017 demonstrated no AFIB, and multiple complaints of palpitations / dizziness / racing heart all correlated with sinus rhythm only.      recently hospitalized @ [facility] (September 2016) for acute decompensated diastolic heart failure at which time patient was started on amiodarone loading with plans to bring patient back for DCCV within one week, however patient reports with medication she returned to sinus rhythm presented to ED today (4/30/17) complaining of increasing dizziness and palpitations for the last week. Pt reports she saw her cardiologist, Dr Mejias, this Wednesday for similar symptoms and was found to be dehydrated with a normal EKG and was sent home. Pt states she went home that night to eat dinner and as she bent over to eat dinner felt weak and dizzy with the room spinning and slowly lowered herself to the floor with no LOC or head trauma. Pt called, Dr Mejias the next day and she called in meclizine for possible vertigo symptoms and patient still felt unsteady and weak when getting out of bed yesterday with associated nausea. Today she spoke with Dr Mejias's covering physician who told her to go to the ER to be further evaluated. Pt admits to SOB when walking 3 blocks, has a chronically left swollen ankle that has been evaluated by ultrasound. In ED, /80, , T 97.2, Platelets 23 (called for manual count, will follow), RR 18, O2 97% on RA, CXR cardiomegaly no pulmonary congestion, Trop 0.1, EKG NSR, no acute changes.  Zofran 4mg x1, valium 2mg x 1, CT head (4/30/17) No appreciable interval change. No hydrocephalus, midline shift, acute intracranial hemorrhage or demarcated territorial infarct. Denies CP, palpitations, vomiting, diarrhea, recent fevers/chills, melena, hematochezia. Pt is admitted to [facility] for further cardiac management  TRIXIE (9/2016): EF 55%, normal LV motion, mild-moderate AR/ MR, moderate atherosclerotic plaque in descending aorta.  MRA chest(9/16/2016) No aortic aneurysm or dissection.Cardiomegaly. Status post mitral valve replacement. Small to moderate-sized bilateral pleural effusions. (30 Apr 2017 20:26)          PAST MEDICAL AND SURGICAL HISTORY:  PAST MEDICAL & SURGICAL HISTORY:  Vasovagal syncope  Migraines  Hypotension  Hypothyroidism  A-fib  H/O mitral valve repair      FAMILY HISTORY: FAMILY HISTORY:      SOCIAL HISTORY: Occupation:  [employer]: Denied  Smoking: Denied  Drug Use: Denied  Marital Status:         REVIEW OF SYSTEM: REVIEW OF SYSTEMS:    CONSTITUTIONAL: No fever, weight loss, or fatigue  EYES: No eye pain, visual disturbances, or discharge  ENMT:  No difficulty hearing, tinnitus, vertigo; No sinus or throat pain  NECK: No pain or stiffness  BREASTS: No pain, masses, or nipple discharge  RESPIRATORY: No cough, wheezing, chills or hemoptysis; No shortness of breath  CARDIOVASCULAR: No chest pain, palpitations, dizziness, or leg swelling  GASTROINTESTINAL: No abdominal or epigastric pain. No nausea, vomiting, or hematemesis; No diarrhea or constipation. No melena or hematochezia.  GENITOURINARY: No dysuria, frequency, hematuria, or incontinence  NEUROLOGICAL: No headaches, memory loss, loss of strength, numbness, or tremors  SKIN: No itching, burning, rashes, or lesions   LYMPH NODES: No enlarged glands  ENDOCRINE: No heat or cold intolerance; No hair loss  MUSCULOSKELETAL: No joint pain or swelling; No muscle, back, or extremity pain  PSYCHIATRIC: No depression, anxiety, mood swings, or difficulty sleeping  HEME/LYMPH: No easy bruising, or bleeding gums  ALLERGY AND IMMUNOLOGIC: No hives or eczema      ALLERGY:  latex (Other)  Orange Juice (Other)  penicillin (Angioedema)      INPATIENT MEDICATIONS:  apixaban 2.5milliGRAM(s) Oral two times a day  levothyroxine 25MICROGram(s) Oral daily  estrogens    conjugated 0.3milliGRAM(s) Oral daily      VITAL SIGNS:   T(C): 36.1, Max: 36.6 (04-30 @ 18:58)  HR: 54 (54 - 103)  BP: 133/64 (113/53 - 168/75)  RR: 16 (15 - 18)  SpO2: 98% (96% - 98%)  Wt(kg): --    PHYSICAL EXAM:   Appearance:   CVS:   Pulm:   Abd:  Soft, NT/ND, + BS	  Ext: No edema    LABS:                        14.6   4.0   )-----------( x        ( 01 May 2017 11:04 )             43.7     05-01    141  |  106  |  15  ----------------------------<  77  3.8   |  26  |  0.77    Ca    8.0<L>      01 May 2017 06:32  Mg     2.0     05-01    TPro  8.1  /  Alb  4.0  /  TBili  0.4  /  DBili  x   /  AST  20  /  ALT  23  /  AlkPhos  59  04-30      TSH  TroponinTroponin I, Serum: 0.129 ng/mL (05-01 @ 06:32)  Troponin I, Serum: 0.121 ng/mL (04-30 @ 20:25)  Troponin I, Serum: 0.115 ng/mL (04-30 @ 16:33)     LIVER FUNCTIONS - ( 30 Apr 2017 16:33 )  Alb: 4.0 g/dL / Pro: 8.1 g/dL / ALK PHOS: 59 U/L / ALT: 23 U/L / AST: 20 U/L / GGT: x             EKG:    Telemetry:    ECHO: HISTORY OF PRESENT ILLNESS:   85 year old female, occasional marijuana user,  with pmHx afib (on Eliquis), hypothyroidism, hypotension, vasovagal syncope, mitral valve repair in 2004, AFIB with rapid ventricular response episode in 9/2016 with presentation of near-syncope, weakness and malaise. She was treated with rate slowing and anticoagulation. She underwent TRIXIE without LA/JINA thrombus, given Amiodarone and had DCCV. She was on multiple medicatiosn after the cardioversion including metoprolol, lasix, amiodarone and eliquis. She has discontinued all but the eliquis as she has complained of intolerance to all the drugs with complaints of GI discomfort (a chronic problem) and dizziness (also a chronic problem). She continued to have complaints of palpitations / dizziness and her outpatient 2-week Heart Monitor in Feb 2017 demonstrated no AFIB, and multiple complaints of palpitations / dizziness / racing heart all correlated with sinus rhythm only.  She continues to kindly decline taking metoprolol or amiodarone.  She went to her cardiologist (Dr. Lisa Goldberg) last Weds for routine followed up and was told that she has very low BP and was encouraged to increase salt intake. After that visit, while standing in the kitchen preparing for dinner, she felt weak and lowered herself down to prevent fall. She felt the room was spinning for about 10 seconds.  She was prescribed meclizine for possible vertigo by her PCP, but she continued having weakness and unsteady sensation so decided to come to the ED.    EPS is called for evaluation of possible atrial arrhythmia causing her symptoms. She [place] Dr. Menchaca in the [place] office.     CT head (4/30/17) No appreciable interval change. No hydrocephalus, midline shift, acute intracranial hemorrhage or demarcated territorial infarct.     TRIXIE (9/2016): EF 55%, normal LV motion, mild-moderate AR/ MR, moderate atherosclerotic plaque in descending aorta.    MRA chest(9/16/2016) No aortic aneurysm or dissection.Cardiomegaly. Status post mitral valve replacement. Small to moderate-sized bilateral pleural effusions.       PAST MEDICAL AND SURGICAL HISTORY:  Vasovagal syncope  Migraines  Hypotension  Hypothyroidism  A-fib  H/O mitral valve repair  Macular degeneration (s/p recent eye injection)      FAMILY HISTORY: n/a    SOCIAL HISTORY:   ETOH: Denied  Smoking: Denied  Drug Use: occasional marijuana use      REVIEW OF SYSTEM: REVIEW OF SYSTEMS:  CONSTITUTIONAL: fatigue. No fever / chills   EYES: No eye pain, visual disturbances, or discharge.    ENMT:  No difficulty hearing, tinnitus, vertigo; No sinus or throat pain  NECK: No pain or stiffness  BREASTS: No pain, masses, or nipple discharge  RESPIRATORY: No cough, wheezing, chills or hemoptysis;  No shortness of breath at rest  CARDIOVASCULAR: See HPI.    GASTROINTESTINAL: "IBS symptoms" - constipation. Denies melena / abd pain  GENITOURINARY: No dysuria, frequency, hematuria, or incontinence  NEUROLOGICAL: No headaches, memory loss, loss of strength, numbness, or tremors  SKIN: No itching, burning, rashes, or lesions   LYMPH NODES: No enlarged glands  ENDOCRINE: No heat or cold intolerance; No hair loss  MUSCULOSKELETAL: No joint pain or swelling; No muscle, back, or extremity pain  PSYCHIATRIC: Anxiety. Denies depression  HEME/LYMPH: No easy bruising, or bleeding gums  ALLERGY AND IMMUNOLOGIC: No hives or eczema      ALLERGY:  latex (Other)  Orange Juice (Other)  penicillin (Angioedema)      INPATIENT MEDICATIONS:  apixaban 2.5milliGRAM(s) Oral two times a day  levothyroxine 25MICROGram(s) Oral daily  estrogens    conjugated 0.3milliGRAM(s) Oral daily      VITAL SIGNS:   T(C): 36.1, Max: 36.6 (04-30 @ 18:58)  HR: 54 (54 - 103)  BP: 133/64 (113/53 - 168/75)  RR: 16 (15 - 18)  SpO2: 98% (96% - 98%)    PHYSICAL EXAM:   Appearance:  NAD. Anxious   CVS: S1/S2 normal, RRR,   Pulm: CTA b/l  Abd:  Soft, NT/ND, + BS	  Ext: trace ankle edema  Neuro: AAOx 3    LABS:                        14.6   4.0   )-----------( 34       ( 01 May 2017 11:04 )             43.7     Platelet (in blue top) 125 K.     141  |  106  |  15  ----------------------------<  77  3.8   |  26  |  0.77    Ca    8.0<L>      01 May 2017 06:32  Mg     2.0     05-01    TPro  8.1  /  Alb  4.0  /  TBili  0.4  /  DBili  x   /  AST  20  /  ALT  23  /  AlkPhos  59  04-30      TroponinTroponin I, Serum: 0.129 ng/mL (05-01 @ 06:32)  Troponin I, Serum: 0.121 ng/mL (04-30 @ 20:25)  Troponin I, Serum: 0.115 ng/mL (04-30 @ 16:33)     LIVER FUNCTIONS - ( 30 Apr 2017 16:33 )  Alb: 4.0 g/dL / Pro: 8.1 g/dL / ALK PHOS: 59 U/L / ALT: 23 U/L / AST: 20 U/L / GGT: x             EKG:    Telemetry:  NSR HR 50-70s.  No arrhythmias

## 2017-05-01 NOTE — PROGRESS NOTE ADULT - PROBLEM SELECTOR PLAN 4
Patient has no observable skin changes, ecchymosis or signs of bleeding. Platelet manual count 60 per Dr. Kyle  -Dr. Kyle, Heme consult recs – schedule bone marrow biopsy in office after d/c for further evaluation.

## 2017-05-01 NOTE — PROGRESS NOTE ADULT - PROBLEM SELECTOR PLAN 3
Patient euvolemic. Recent Echo (9/2016) EF 55% with mild-moderate AR/MR.   - Daily weight, I&Os, fluid restriction.   - follow up echo

## 2017-05-01 NOTE — PROGRESS NOTE ADULT - PROBLEM SELECTOR PLAN 5
- TSH and T4 wnl  - continue levothyroxine 50mcg daily    DISPO: Pending echo, carotid duplex and PT recs

## 2017-05-01 NOTE — DISCHARGE NOTE ADULT - CARE PLAN
Principal Discharge DX:	Dizziness  Goal:	You came to the hospital with dizziness and had a CT scan of your head which was negative as was the Echocardiogram of your heart and Ultrasound of your Carotid Arteries in your neck.  Instructions for follow-up, activity and diet:	Follow up with your Cardiologist Dr. Mejias at [facility] in 1-2 weeks.  Secondary Diagnosis:	A-fib  Goal:	You have an irregular heart rhythm and need to continue taking your Eliquis as prescribed 2.5mg twice a day.  Instructions for follow-up, activity and diet:	Follow up with your Cardiologist Dr. Mejias at [facility] in 1-2 weeks as well as Dr. Menchaca [address], [address]) [phone].  Secondary Diagnosis:	Hypothyroidism  Goal:	Continue your Synthroid 50mcg daily  Secondary Diagnosis:	Thrombocytopenia  Goal:	You have a low platelet count and Dr. Kyle would like to see you in her office in 1 week to follow up.  Instructions for follow-up, activity and diet:	Dr. Kyle office is at [address] [place] [address] 3rd floor ([phone]) Principal Discharge DX:	Dizziness  Goal:	You came to the hospital with dizziness and had a CT scan of your head which was negative as was the Echocardiogram of your heart and Ultrasound of your Carotid Arteries in your neck.  Instructions for follow-up, activity and diet:	Follow up with your Cardiologist Dr. Dr. Goldberg at [facility] in 1-2 weeks.  Secondary Diagnosis:	A-fib  Goal:	You have an irregular heart rhythm and need to continue taking your Eliquis as prescribed 2.5mg twice a day.  Instructions for follow-up, activity and diet:	Follow up with your Cardiologist Dr. Goldberg at [facility] in 1-2 weeks as well as Dr. Menchaca [address], [address]) [phone].  Secondary Diagnosis:	Hypothyroidism  Goal:	Continue your Synthroid 50mcg daily  Secondary Diagnosis:	Thrombocytopenia  Goal:	You have a low platelet count and Dr. Kyle would like to see you in her office in 1 week to follow up.  Instructions for follow-up, activity and diet:	Dr. Kyle office is at [address] [place] [address] 3rd floor ([phone]) Principal Discharge DX:	Dizziness  Goal:	You came to the hospital with dizziness and had a CT scan of your head which was negative as was the Echocardiogram of your heart and Ultrasound of your Carotid Arteries in your neck.  Instructions for follow-up, activity and diet:	Follow up with your Cardiologist Dr. Dr. Goldberg at [facility] in 1-2 weeks.  Secondary Diagnosis:	A-fib  Goal:	You have an irregular heart rhythm and need to continue taking your Eliquis as prescribed 2.5mg twice a day.  Instructions for follow-up, activity and diet:	Follow up with your Cardiologist Dr. Goldberg at [facility] in 1-2 weeks as well as Dr. Menchaca [address], [address] [phone].  Secondary Diagnosis:	Hypothyroidism  Goal:	Continue your Synthroid 50mcg daily  Secondary Diagnosis:	Thrombocytopenia  Goal:	You have a low platelet count and Dr. Kyle would like to see you in her office in 1 week to follow up.  Instructions for follow-up, activity and diet:	Dr. Kyle office is at [address] [place] [address] 3rd floor ([phone]) Principal Discharge DX:	Dizziness  Goal:	You came to the hospital with dizziness and had a CT scan of your head which was negative as was the Echocardiogram of your heart and Ultrasound of your Carotid Arteries in your neck.  Instructions for follow-up, activity and diet:	Follow up with your Cardiologist Dr. Dr. Goldberg at [facility] in 1-2 weeks.  Secondary Diagnosis:	A-fib  Goal:	You have an irregular heart rhythm and need to continue taking your Eliquis as prescribed 2.5mg twice a day.  Instructions for follow-up, activity and diet:	Follow up with your Cardiologist Dr. Goldberg at [facility] in 1-2 weeks as well as Dr. Menchaca [address], [address] [phone].  Secondary Diagnosis:	Hypothyroidism  Goal:	Continue your Synthroid 50mcg daily  Secondary Diagnosis:	Thrombocytopenia  Goal:	You have a low platelet count and Dr. Kyle would like to see you in her office in 1 week to follow up.  Instructions for follow-up, activity and diet:	Dr. Kyle office is at [address] [place] [address] 3rd floor ([phone]) Principal Discharge DX:	Dizziness  Goal:	You came to the hospital with dizziness and had a CT scan of your head which was negative as was the Echocardiogram of your heart and Ultrasound of your Carotid Arteries in your neck.  Instructions for follow-up, activity and diet:	Follow up with your Cardiologist Dr. Dr. Goldberg at [facility] in 1-2 weeks.  Secondary Diagnosis:	A-fib  Goal:	You have an irregular heart rhythm and need to continue taking your Eliquis as prescribed 2.5mg twice a day.  Instructions for follow-up, activity and diet:	Follow up with your Cardiologist Dr. Goldberg at [facility] in 1-2 weeks as well as Dr. Menchaca [address], [address]) [phone].  Secondary Diagnosis:	Hypothyroidism  Goal:	Continue your Synthroid 50mcg daily  Secondary Diagnosis:	Thrombocytopenia  Goal:	You have a low platelet count and Dr. Kyle would like to see you in her office in 1 week to follow up.  Instructions for follow-up, activity and diet:	Dr. Kyle office is at [address] [place] [address] 3rd floor ([phone])

## 2017-05-01 NOTE — CONSULT NOTE ADULT - SUBJECTIVE AND OBJECTIVE BOX
HPI:  85 year old female, occasional marijuana user,  with pmHx afib (on Eliquis, s/p DCCV with amiodarone therapy afterwards), hypothyroidism, hypotension, vasovagal syncope, mitral valve repair in 2004, recently hospitalized @ [facility] (September 2016) for acute decompensated diastolic heart failure at which time patient was started on amiodarone loading with plans to bring patient back for DCCV within one week, however patient reports with medication she returned to sinus rhythm presented to ED today (4/30/17) complaining of increasing dizziness and palpitations for the last week. Pt reports she saw her cardiologist, Dr Mejias, this Wednesday for similar symptoms and was found to be dehydrated with a normal EKG and was sent home. Pt states she went home that night to eat dinner and as she bent over to eat dinner felt weak and dizzy with the room spinning and slowly lowered herself to the floor with no LOC or head trauma. Pt called, Dr Mejias the next day and she called in meclizine for possible vertigo symptoms and patient still felt unsteady and weak when getting out of bed yesterday with associated nausea. Today she spoke with Dr Mejias's covering physician who told her to go to the ER to be further evaluated. Pt admits to SOB when walking 3 blocks, has a chronically left swollen ankle that has been evaluated by ultrasound. In ED, /80, , T 97.2, Platelets 23 (called for manual count, will follow), RR 18, O2 97% on RA, CXR cardiomegaly no pulmonary congestion, Trop 0.1, EKG NSR, no acute changes.  Zofran 4mg x1, valium 2mg x 1, CT head (4/30/17) No appreciable interval change. No hydrocephalus, midline shift, acute intracranial hemorrhage or demarcated territorial infarct. Denies CP, palpitations, vomiting, diarrhea, recent fevers/chills, melena, hematochezia. Pt is admitted to [facility] for further cardiac management  TRIXIE (9/2016): EF 55%, normal LV motion, mild-moderate AR/ MR, moderate atherosclerotic plaque in descending aorta.  MRA chest(9/16/2016) No aortic aneurysm or dissection.Cardiomegaly. Status post mitral valve replacement. Small to moderate-sized bilateral pleural effusions. (30 Apr 2017 20:26)    FAMILY HISTORY:    MEDICATIONS  (STANDING):  apixaban 2.5milliGRAM(s) Oral two times a day  estrogens    conjugated 0.3milliGRAM(s) Oral daily  levothyroxine 50MICROGram(s) Oral daily    MEDICATIONS  (PRN):  zaleplon 5milliGRAM(s) Oral at bedtime PRN Insomnia    Vital Signs Last 24 Hrs  T(C): 36.4, Max: 36.4 (05-01 @ 14:00)  T(F): 97.5, Max: 97.5 (05-01 @ 14:00)  HR: 73 (66 - 76)  BP: 139/63 (107/51 - 139/63)  BP(mean): --  RR: 15 (15 - 16)  SpO2: 100% (96% - 100%)PHYSICAL EXAM:    Constitutional:    Neck:    Breasts:    Respiratory:    Cardiovascular:    Gastrointestinal:    Extremities:    Neurological:    Skin:    Lymph Nodes:      Labs:  CBC Full  -  ( 01 May 2017 11:04 )  WBC Count : 4.0 K/uL  Hemoglobin : 14.6 g/dL  Hematocrit : 43.7 %  Platelet Count - Automated : 60 K/uL  Mean Cell Volume : 88.3 fL  Mean Cell Hemoglobin : 29.5 pg  Mean Cell Hemoglobin Concentration : 33.4 g/dL  Auto Neutrophil # : x  Auto Lymphocyte # : x  Auto Monocyte # : x  Auto Eosinophil # : x  Auto Basophil # : x  Auto Neutrophil % : x  Auto Lymphocyte % : x  Auto Monocyte % : x  Auto Eosinophil % : x  Auto Basophil % : x    05-01    141  |  106  |  15  ----------------------------<  77  3.8   |  26  |  0.77    Ca    8.0<L>      01 May 2017 06:32  Mg     2.0     05-01    TPro  8.1  /  Alb  4.0  /  TBili  0.4  /  DBili  x   /  AST  20  /  ALT  23  /  AlkPhos  59  04-30      Radiology:  HEALTH ISSUES - R/O PROBLEM Dx:    Assessmant:  1)long standing thrombocytopenia without overt bleeding spontaneously or during invasive procedures  At 60k platelet count   low platelets on peripheral smear)there is no risk of bleeding  Plan:  Will do bone marrow exam in my office      Thank you  Mitzi Kyle MD

## 2017-05-01 NOTE — DISCHARGE NOTE ADULT - HOSPITAL COURSE
84yo F, occasional marijuana user, with PMHx afib (on Eliquis, s/p failed DCCV with amiodarone and subsequent conversion to NSR), hypothyroidism, hypotension, vasovagal syncope, mitral valve repair in 2004, recently hospitalized @ [facility] (September 2016) for acute decompensated diastolic heart failure (EF 55%, mild-moderate AR/MR).  She presented to [facility] ED (4/30/17) complaining of increasing dizziness and palpitations for the past week. Pt reports she saw her cardiologist, Dr Mejias, this Wednesday for similar symptoms and was found to be dehydrated with a normal EKG and was sent home. Pt states she went home that night to eat dinner and as she bent over to eat dinner felt weak and dizzy with the room spinning and slowly lowered herself to the floor with no LOC or head trauma. Pt called Dr Mejias the next day and was prescribed meclizine for possible vertigo symptoms. Patient still felt unsteady and weak when getting out of bed yesterday with associated nausea. Patient then spoke with Dr Mejias's covering physician who told her to go to the ER to be further evaluated. Pt admits to SOB when walking 3 blocks, has a chronically left swollen ankle that has been evaluated by ultrasound. In ED, /80, , T 97.2, Platelets 23 (called for manual count) RR 18, O2 97% on RA, CXR cardiomegaly with no pulmonary congestion, Trop 0.1, EKG NSR, no acute changes.  She was given Zofran 4mg x1 and valium 2mg x 1. CT head (4/30/17) showed no hydrocephalus, midline shift, acute intracranial hemorrhage or demarcated territorial infarct. Pt was admitted to [facility] for further cardiac management. Patient troponin trend 0.115->0.121 -> 0.129. Hematology, Dr. Kyle consulted for low platelet count and patient confirmed to have thrombocytopenia with platelet count of 60. Stable per Dr. Kyle, but patient should follow up as an outpatient for bone marrow biopsy. Dr. Menchaca, EP, consulted who recommended loop recorder placement, BB and amio. However, patient refusing all. Patient underwent echocardiogram revealing... 84yo F, occasional marijuana user, with PMHx afib (on Eliquis, s/p failed DCCV with amiodarone and subsequent conversion to NSR), hypothyroidism, hypotension, vasovagal syncope, mitral valve repair in 2004, recently hospitalized @ [facility] (September 2016) for acute decompensated diastolic heart failure (EF 55%, mild-moderate AR/MR).  She presented to [facility] ED (4/30/17) complaining of increasing dizziness and palpitations for the past week. Pt reports she saw her cardiologist, Dr Mejias, this Wednesday for similar symptoms and was found to be dehydrated with a normal EKG and was sent home. Pt states she went home that night to eat dinner and as she bent over to eat dinner felt weak and dizzy with the room spinning and slowly lowered herself to the floor with no LOC or head trauma. Pt called Dr Mejias the next day and was prescribed meclizine for possible vertigo symptoms. Patient still felt unsteady and weak when getting out of bed yesterday with associated nausea. Patient then spoke with Dr Mejias's covering physician who told her to go to the ER to be further evaluated. Pt admits to SOB when walking 3 blocks, has a chronically left swollen ankle that has been evaluated by ultrasound. In ED, /80, , T 97.2, Platelets 23 (called for manual count) RR 18, O2 97% on RA, CXR cardiomegaly with no pulmonary congestion, Trop 0.1, EKG NSR, no acute changes.  She was given Zofran 4mg x1 and valium 2mg x 1. CT head (4/30/17) showed no hydrocephalus, midline shift, acute intracranial hemorrhage or demarcated territorial infarct. Pt was admitted to [facility] for further cardiac management. Patient troponin trend 0.115->0.121 -> 0.129. Hematology, Dr. Kyle consulted for low platelet count and patient confirmed to have thrombocytopenia with platelet count of 60. Stable per Dr. Kyle, but patient should follow up as an outpatient for bone marrow biopsy. Dr. Menchaca, EP, consulted who recommended loop recorder placement, BB and amio. However, patient refusing all. Patient underwent echocardiogram revealing EF 60-65%, LA mildly dilated, interatrial septal aneurysm, mild AI, mitral ring in place with mild-mod MR. Carotid U/S also performed revealing no significant stenosis. 86yo F, occasional marijuana user, with PMHx afib (on Eliquis, s/p failed DCCV with amiodarone and subsequent conversion to NSR), hypothyroidism, hypotension, vasovagal syncope, mitral valve repair in 2004, recently hospitalized @ [facility] (September 2016) for acute decompensated diastolic heart failure (EF 55%, mild-moderate AR/MR).  She presented to [facility] ED (4/30/17) complaining of increasing dizziness and palpitations for the past week. Pt reports she saw her cardiologist, Dr Mejias, this Wednesday for similar symptoms and was found to be dehydrated with a normal EKG and was sent home. Pt states she went home that night to eat dinner and as she bent over to eat dinner felt weak and dizzy with the room spinning and slowly lowered herself to the floor with no LOC or head trauma. Pt called Dr Mejias the next day and was prescribed meclizine for possible vertigo symptoms. Patient still felt unsteady and weak when getting out of bed yesterday with associated nausea. Patient then spoke with Dr Mejias's covering physician who told her to go to the ER to be further evaluated. Pt admits to SOB when walking 3 blocks, has a chronically left swollen ankle that has been evaluated by ultrasound. In ED, /80, , T 97.2, Platelets 23 (called for manual count) RR 18, O2 97% on RA, CXR cardiomegaly with no pulmonary congestion, Trop 0.1, EKG NSR, no acute changes.  She was given Zofran 4mg x1 and valium 2mg x 1. CT head (4/30/17) showed no hydrocephalus, midline shift, acute intracranial hemorrhage or demarcated territorial infarct. Pt was admitted to [facility] for further cardiac management. Patient troponin trend 0.115->0.121 -> 0.129. Hematology, Dr. Kyle consulted for low platelet count and patient confirmed to have thrombocytopenia with platelet count of 60. Stable per Dr. Kyle, but patient should follow up as an outpatient for bone marrow biopsy. Dr. Menchaca, EP, consulted who recommended loop recorder placement, BB and amio. However, patient refusing all. Patient underwent echocardiogram revealing EF 60-65%, LA mildly dilated, interatrial septal aneurysm, mild AI, mitral ring in place with mild-mod MR. Carotid U/S also performed revealing no significant stenosis. Pt was given copies of both echo and ultrasound to f/u in her Cardiologists office Dr. Mejias in 1-2 weeks as well as Dr. Menchaca and Dr. Kyle.  Case d/w Dr. Toledo and pt stable for d/c home. 86yo F, occasional marijuana user, with PMHx afib (on Eliquis, s/p failed DCCV with amiodarone and subsequent conversion to NSR), hypothyroidism, hypotension, vasovagal syncope, mitral valve repair in 2004, recently hospitalized @ [facility] (September 2016) for acute decompensated diastolic heart failure (EF 55%, mild-moderate AR/MR).  She presented to [facility] ED (4/30/17) complaining of increasing dizziness and palpitations for the past week. Pt reports she saw her cardiologist, Dr Goldberg, this Wednesday for similar symptoms and was found to be dehydrated with a normal EKG and was sent home. Pt states she went home that night to eat dinner and as she bent over to eat dinner felt weak and dizzy with the room spinning and slowly lowered herself to the floor with no LOC or head trauma. Pt called Dr Goldberg the next day and was prescribed meclizine for possible vertigo symptoms. Patient still felt unsteady and weak when getting out of bed yesterday with associated nausea. Patient then spoke with Dr Goldberg's covering physician who told her to go to the ER to be further evaluated. Pt admits to SOB when walking 3 blocks, has a chronically left swollen ankle that has been evaluated by ultrasound. In ED, /80, , T 97.2, Platelets 23 (called for manual count) RR 18, O2 97% on RA, CXR cardiomegaly with no pulmonary congestion, Trop 0.1, EKG NSR, no acute changes.  She was given Zofran 4mg x1 and valium 2mg x 1. CT head (4/30/17) showed no hydrocephalus, midline shift, acute intracranial hemorrhage or demarcated territorial infarct. Pt was admitted to [facility] for further cardiac management. Patient troponin trend 0.115->0.121 -> 0.129. Hematology, Dr. Kyle consulted for low platelet count and patient confirmed to have thrombocytopenia with platelet count of 60. Stable per Dr. Kyle, but patient should follow up as an outpatient for bone marrow biopsy. Dr. Menchaca, EP, consulted who recommended loop recorder placement, BB and amio. However, patient refusing all. Patient underwent echocardiogram revealing EF 60-65%, LA mildly dilated, interatrial septal aneurysm, mild AI, mitral ring in place with mild-mod MR. Carotid U/S also performed revealing no significant stenosis. Pt was given copies of both echo and ultrasound to f/u in her Cardiologists office Dr. Goldberg in 1-2 weeks as well as Dr. Menchaca and Dr. Kyle.  Case d/w Dr. Toledo and pt stable for d/c home. 86yo F, occasional marijuana user, with PMHx afib (on Eliquis, s/p failed DCCV with amiodarone and subsequent conversion to NSR), hypothyroidism, hypotension, vasovagal syncope, mitral valve repair in 2004, recently hospitalized @ [facility] (September 2016) for acute decompensated diastolic heart failure (EF 55%, mild-moderate AR/MR).  She presented to [facility] ED (4/30/17) complaining of increasing dizziness and palpitations for the past week. Pt reports she saw her cardiologist, Dr Goldberg, this Wednesday for similar symptoms and was found to be dehydrated with a normal EKG and was sent home. Pt states she went home that night to eat dinner and as she bent over to eat dinner felt weak and dizzy with the room spinning and slowly lowered herself to the floor with no LOC or head trauma. Pt called Dr Goldberg the next day and was prescribed meclizine for possible vertigo symptoms. Patient still felt unsteady and weak when getting out of bed yesterday with associated nausea. Patient then spoke with Dr Goldberg's covering physician who told her to go to the ER to be further evaluated. Pt admits to SOB when walking 3 blocks, has a chronically left swollen ankle that has been evaluated by ultrasound. In ED, /80, , T 97.2, Platelets 23 (called for manual count) RR 18, O2 97% on RA, CXR cardiomegaly with no pulmonary congestion, Trop 0.1, EKG NSR, no acute changes.  She was given Zofran 4mg x1 and valium 2mg x 1. CT head (4/30/17) showed no hydrocephalus, midline shift, acute intracranial hemorrhage or demarcated territorial infarct. Pt was admitted to [facility] for further cardiac management. Patient troponin trend 0.115->0.121 -> 0.129. Hematology, Dr. Kyle consulted for low platelet count and patient confirmed to have thrombocytopenia with platelet count of 60. Stable per Dr. Kyle, but patient should follow up as an outpatient for bone marrow biopsy. Dr. Menchaca, EP, consulted who recommended loop recorder placement, BB and amio. However, patient refusing all. Patient underwent echocardiogram revealing EF 60-65%, LA mildly dilated, interatrial septal aneurysm, mild AI, mitral ring in place with mild-mod MR. Carotid U/S also performed revealing no significant stenosis. Physical Therapy evaluated pt and no home needs but may f/u with her Cardiologist regarding possible Vestibular rehab. Pt was given copies of both echo and ultrasound to f/u in her Cardiologists office Dr. Goldberg in 1-2 weeks as well as Dr. Menchaca and Dr. Kyle.  Case d/w Dr. Toledo and pt stable for d/c home.

## 2017-05-01 NOTE — PROGRESS NOTE ADULT - SUBJECTIVE AND OBJECTIVE BOX
Interventional Cardiology PA Adult Progress Note    Subjective Assessment: Patient was seen and ex  	  MEDICATIONS:  levothyroxine 25MICROGram(s) Oral daily  estrogens    conjugated 0.3milliGRAM(s) Oral daily  apixaban 2.5milliGRAM(s) Oral two times a day      	    [PHYSICAL EXAM:  TELEMETRY:  T(C): 36.4, Max: 36.6 (04-30 @ 18:58)  HR: 72 (66 - 98)  BP: 125/58 (113/53 - 168/75)  RR: 16 (15 - 18)  SpO2: 97% (96% - 98%)  Wt(kg): --  I&O's Summary  I & Os for 24h ending 01 May 2017 07:00  =============================================  IN: 0 ml / OUT: 350 ml / NET: -350 ml    I & Os for current day (as of 01 May 2017 17:21)  =============================================  IN: 540 ml / OUT: 750 ml / NET: -210 ml    Height (cm): 160 (04-30 @ 19:38)  Weight (kg): 50 (04-30 @ 19:38)  BMI (kg/m2): 19.5 (04-30 @ 19:38)  BSA (m2): 1.5 (04-30 @ 19:38)  [place]:  Central/PICC/Mid Line:                                         Appearance: Normal	  HEENT:   Normal oral mucosa, PERRL, EOMI	  Neck: Supple, + JVD/ - JVD; Carotid Bruit   Cardiovascular: Normal S1 S2, No JVD, No murmurs,   Respiratory: Lungs clear to auscultation/Decreased Breath Sounds/No Rales, Rhonchi, Wheezing	  Gastrointestinal:  Soft, Non-tender, + BS	  Skin: No rashes, No ecchymoses, No cyanosis  Extremities: Normal range of motion, No clubbing, cyanosis or edema  Vascular: Peripheral pulses palpable 2+ bilaterally  Neurologic: Non-focal  Psychiatry: A & O x 3, Mood & affect appropriate      	    ECG:  	  RADIOLOGY:   DIAGNOSTIC TESTING:  [ ] Echocardiogram:  [ ]  Catheterization:  [ ] Stress Test:    [ ] TRIXIE  OTHER: 	    LABS:	 	  CARDIAC MARKERS:  Troponin I, Serum: 0.129 ng/mL (05-01 @ 06:32)  Troponin I, Serum: 0.121 ng/mL (04-30 @ 20:25)          Troponin I, Serum: 0.129 ng/mL (05-01 @ 06:32)  Troponin I, Serum: 0.121 ng/mL (04-30 @ 20:25)                          14.6   4.0   )-----------( 60       ( 01 May 2017 11:04 )             43.7     05-01    141  |  106  |  15  ----------------------------<  77  3.8   |  26  |  0.77    Ca    8.0<L>      01 May 2017 06:32  Mg     2.0     05-01    TPro  8.1  /  Alb  4.0  /  TBili  0.4  /  DBili  x   /  AST  20  /  ALT  23  /  AlkPhos  59  04-30    proBNP:   Lipid Profile:   HgA1c:   TSH: Thyroid Stimulating Hormone, Serum: 2.334 uIU/mL (05-01 @ 11:04)        ASSESSMENT/PLAN: 	        DVT ppx:  Dispo: Interventional Cardiology PA Adult Progress Note    Subjective Assessment: Patient was seen and examined this AM and is asymptomatic without complaints. Patient expresses she is very anxious regarding her symptoms and is concerned she is going into afib.  	  MEDICATIONS:  levothyroxine 25MICROGram(s) Oral daily  estrogens    conjugated 0.3milliGRAM(s) Oral daily  apixaban 2.5milliGRAM(s) Oral two times a day      [PHYSICAL EXAM:  TELEMETRY:  T(C): 36.4, Max: 36.6 (04-30 @ 18:58)  HR: 72 (66 - 98)  BP: 125/58 (113/53 - 168/75)  RR: 16 (15 - 18)  SpO2: 97% (96% - 98%)  Wt(kg): --  I&O's Summary  I & Os for 24h ending 01 May 2017 07:00  =============================================  IN: 0 ml / OUT: 350 ml / NET: -350 ml    I & Os for current day (as of 01 May 2017 17:21)  =============================================  IN: 540 ml / OUT: 750 ml / NET: -210 ml    Height (cm): 160 (04-30 @ 19:38)  Weight (kg): 50 (04-30 @ 19:38)  BMI (kg/m2): 19.5 (04-30 @ 19:38)  BSA (m2): 1.5 (04-30 @ 19:38)                                     Appearance: Normal	  HEENT:   Normal oral mucosa, PERRL, EOMI	  Neck: Supple, + JVD/ - JVD; Carotid Bruit   Cardiovascular: Normal S1 S2, No JVD, No murmurs,   Respiratory: Lungs clear to auscultation/Decreased Breath Sounds/No Rales, Rhonchi, Wheezing	  Gastrointestinal:  Soft, Non-tender, + BS	  Skin: No rashes, No ecchymoses, No cyanosis  Extremities: Normal range of motion, No clubbing, cyanosis or edema  Vascular: Peripheral pulses palpable 2+ bilaterally  Neurologic: Non-focal  Psychiatry: A & O x 3, Mood & affect appropriate  	    LABS:	 	  CARDIAC MARKERS:  Troponin I, Serum: 0.129 ng/mL (05-01 @ 06:32)  Troponin I, Serum: 0.121 ng/mL (04-30 @ 20:25)          Troponin I, Serum: 0.129 ng/mL (05-01 @ 06:32)  Troponin I, Serum: 0.121 ng/mL (04-30 @ 20:25)                          14.6   4.0   )-----------( 60       ( 01 May 2017 11:04 )             43.7     05-01    141  |  106  |  15  ----------------------------<  77  3.8   |  26  |  0.77    Ca    8.0<L>      01 May 2017 06:32  Mg     2.0     05-01    TPro  8.1  /  Alb  4.0  /  TBili  0.4  /  DBili  x   /  AST  20  /  ALT  23  /  AlkPhos  59  04-30    proBNP:   Lipid Profile:   HgA1c:   TSH: Thyroid Stimulating Hormone, Serum: 2.334 uIU/mL (05-01 @ 11:04)        ASSESSMENT/PLAN: 	        DVT ppx:  Dispo: Interventional Cardiology PA Adult Progress Note    Subjective Assessment: Patient was seen and examined this AM and is asymptomatic without complaints. Patient expresses she is very anxious regarding her symptoms and is concerned she is going into afib. Denies CP, palpitations, SOB.  	  MEDICATIONS:  levothyroxine 25MICROGram(s) Oral daily  estrogens    conjugated 0.3milliGRAM(s) Oral daily  apixaban 2.5milliGRAM(s) Oral two times a day      [PHYSICAL EXAM:  TELEMETRY:  T(C): 36.4, Max: 36.6 (04-30 @ 18:58)  HR: 72 (66 - 98)  BP: 125/58 (113/53 - 168/75)  RR: 16 (15 - 18)  SpO2: 97% (96% - 98%)  Wt(kg): --  I&O's Summary  I & Os for 24h ending 01 May 2017 07:00  =============================================  IN: 0 ml / OUT: 350 ml / NET: -350 ml    I & Os for current day (as of 01 May 2017 17:21)  =============================================  IN: 540 ml / OUT: 750 ml / NET: -210 ml    Height (cm): 160 (04-30 @ 19:38)  Weight (kg): 50 (04-30 @ 19:38)  BMI (kg/m2): 19.5 (04-30 @ 19:38)  BSA (m2): 1.5 (04-30 @ 19:38)                                     Appearance: Normal	  HEENT:   Normal oral mucosa, PERRL, EOMI	  Neck: Supple,  - JVD; No Carotid Bruit   Cardiovascular: Normal S1 S2, No JVD, No murmurs  Respiratory: Lungs clear to auscultation, No Rales, Rhonchi, Wheezing	  Gastrointestinal:  Soft, Non-tender, + BS	  Skin: No rashes, No ecchymoses, No cyanosis  Extremities: Normal range of motion, No clubbing, cyanosis or edema  Vascular: Peripheral pulses palpable 2+ bilaterally  Neurologic: Non-focal  Psychiatry: A & O x 3, Mood & affect appropriate  	    LABS:	 	  CARDIAC MARKERS:  Troponin I, Serum: 0.129 ng/mL (05-01 @ 06:32)  Troponin I, Serum: 0.121 ng/mL (04-30 @ 20:25)                          14.6   4.0   )-----------( 60       ( 01 May 2017 11:04 )             43.7     05-01    141  |  106  |  15  ----------------------------<  77  3.8   |  26  |  0.77    Ca    8.0<L>      01 May 2017 06:32  Mg     2.0     05-01    TPro  8.1  /  Alb  4.0  /  TBili  0.4  /  DBili  x   /  AST  20  /  ALT  23  /  AlkPhos  59  04-30    TSH: Thyroid Stimulating Hormone, Serum: 2.334 uIU/mL (05-01 @ 11:04)

## 2017-05-02 VITALS — TEMPERATURE: 97 F

## 2017-05-02 LAB
ANION GAP SERPL CALC-SCNC: 7 MMOL/L — LOW (ref 9–16)
BUN SERPL-MCNC: 15 MG/DL — SIGNIFICANT CHANGE UP (ref 7–23)
CALCIUM SERPL-MCNC: 8.2 MG/DL — LOW (ref 8.5–10.5)
CHLORIDE SERPL-SCNC: 103 MMOL/L — SIGNIFICANT CHANGE UP (ref 96–108)
CO2 SERPL-SCNC: 28 MMOL/L — SIGNIFICANT CHANGE UP (ref 22–31)
CREAT SERPL-MCNC: 0.78 MG/DL — SIGNIFICANT CHANGE UP (ref 0.5–1.3)
GLUCOSE SERPL-MCNC: 83 MG/DL — SIGNIFICANT CHANGE UP (ref 70–99)
HCT VFR BLD CALC: 40.1 % — SIGNIFICANT CHANGE UP (ref 34.5–45)
HGB BLD-MCNC: 13.4 G/DL — SIGNIFICANT CHANGE UP (ref 11.5–15.5)
MAGNESIUM SERPL-MCNC: 2 MG/DL — SIGNIFICANT CHANGE UP (ref 1.6–2.4)
MCHC RBC-ENTMCNC: 29.3 PG — SIGNIFICANT CHANGE UP (ref 27–34)
MCHC RBC-ENTMCNC: 33.4 G/DL — SIGNIFICANT CHANGE UP (ref 32–36)
MCV RBC AUTO: 87.6 FL — SIGNIFICANT CHANGE UP (ref 80–100)
PLATELET # BLD AUTO: 33 K/UL — LOW (ref 150–400)
POTASSIUM SERPL-MCNC: 3.8 MMOL/L — SIGNIFICANT CHANGE UP (ref 3.5–5.3)
POTASSIUM SERPL-SCNC: 3.8 MMOL/L — SIGNIFICANT CHANGE UP (ref 3.5–5.3)
RBC # BLD: 4.58 M/UL — SIGNIFICANT CHANGE UP (ref 3.8–5.2)
RBC # FLD: 13.9 % — SIGNIFICANT CHANGE UP (ref 10.3–16.9)
SODIUM SERPL-SCNC: 138 MMOL/L — SIGNIFICANT CHANGE UP (ref 135–145)
T3 SERPL-MCNC: 95 NG/DL — SIGNIFICANT CHANGE UP (ref 80–200)
WBC # BLD: 6.1 K/UL — SIGNIFICANT CHANGE UP (ref 3.8–10.5)
WBC # FLD AUTO: 6.1 K/UL — SIGNIFICANT CHANGE UP (ref 3.8–10.5)

## 2017-05-02 PROCEDURE — 97161 PT EVAL LOW COMPLEX 20 MIN: CPT

## 2017-05-02 PROCEDURE — 80061 LIPID PANEL: CPT

## 2017-05-02 PROCEDURE — 84484 ASSAY OF TROPONIN QUANT: CPT

## 2017-05-02 PROCEDURE — 96374 THER/PROPH/DIAG INJ IV PUSH: CPT

## 2017-05-02 PROCEDURE — 93880 EXTRACRANIAL BILAT STUDY: CPT | Mod: 26

## 2017-05-02 PROCEDURE — 99238 HOSP IP/OBS DSCHRG MGMT 30/<: CPT

## 2017-05-02 PROCEDURE — 83880 ASSAY OF NATRIURETIC PEPTIDE: CPT

## 2017-05-02 PROCEDURE — 70450 CT HEAD/BRAIN W/O DYE: CPT

## 2017-05-02 PROCEDURE — 36415 COLL VENOUS BLD VENIPUNCTURE: CPT

## 2017-05-02 PROCEDURE — 85027 COMPLETE CBC AUTOMATED: CPT

## 2017-05-02 PROCEDURE — 80053 COMPREHEN METABOLIC PANEL: CPT

## 2017-05-02 PROCEDURE — 82550 ASSAY OF CK (CPK): CPT

## 2017-05-02 PROCEDURE — 85025 COMPLETE CBC W/AUTO DIFF WBC: CPT

## 2017-05-02 PROCEDURE — 93880 EXTRACRANIAL BILAT STUDY: CPT

## 2017-05-02 PROCEDURE — 99285 EMERGENCY DEPT VISIT HI MDM: CPT | Mod: 25

## 2017-05-02 PROCEDURE — 84443 ASSAY THYROID STIM HORMONE: CPT

## 2017-05-02 PROCEDURE — 82553 CREATINE MB FRACTION: CPT

## 2017-05-02 PROCEDURE — 93005 ELECTROCARDIOGRAM TRACING: CPT

## 2017-05-02 PROCEDURE — 93306 TTE W/DOPPLER COMPLETE: CPT

## 2017-05-02 PROCEDURE — 80048 BASIC METABOLIC PNL TOTAL CA: CPT

## 2017-05-02 PROCEDURE — 93306 TTE W/DOPPLER COMPLETE: CPT | Mod: 26

## 2017-05-02 PROCEDURE — 71045 X-RAY EXAM CHEST 1 VIEW: CPT

## 2017-05-02 PROCEDURE — 84436 ASSAY OF TOTAL THYROXINE: CPT

## 2017-05-02 PROCEDURE — 83735 ASSAY OF MAGNESIUM: CPT

## 2017-05-02 PROCEDURE — 84480 ASSAY TRIIODOTHYRONINE (T3): CPT

## 2017-05-02 RX ORDER — LACTULOSE 10 G/15ML
20 SOLUTION ORAL ONCE
Qty: 0 | Refills: 0 | Status: DISCONTINUED | OUTPATIENT
Start: 2017-05-02 | End: 2017-05-02

## 2017-05-02 RX ORDER — POTASSIUM CHLORIDE 20 MEQ
20 PACKET (EA) ORAL ONCE
Qty: 0 | Refills: 0 | Status: COMPLETED | OUTPATIENT
Start: 2017-05-02 | End: 2017-05-02

## 2017-05-02 RX ADMIN — APIXABAN 2.5 MILLIGRAM(S): 2.5 TABLET, FILM COATED ORAL at 07:08

## 2017-05-02 RX ADMIN — Medication 20 MILLIEQUIVALENT(S): at 12:09

## 2017-05-02 RX ADMIN — Medication 10 MILLIGRAM(S): at 07:52

## 2017-05-02 RX ADMIN — Medication 50 MICROGRAM(S): at 07:08

## 2017-05-02 NOTE — PHYSICAL THERAPY INITIAL EVALUATION ADULT - PERTINENT HX OF CURRENT PROBLEM, REHAB EVAL
86 year old female who states she went home one evening to eat dinner and as she bent over to eat dinner felt weak and dizzy with the room spinning and slowly lowered herself to the floor with no LOC or head trauma. Pt called, Dr Mejias the next day and she called in meclizine for possible vertigo symptoms and patient still felt unsteady and weak when getting out of bed yesterday with associated nausea.

## 2017-05-08 DIAGNOSIS — G43.909 MIGRAINE, UNSPECIFIED, NOT INTRACTABLE, WITHOUT STATUS MIGRAINOSUS: ICD-10-CM

## 2017-05-08 DIAGNOSIS — I48.91 UNSPECIFIED ATRIAL FIBRILLATION: ICD-10-CM

## 2017-05-08 DIAGNOSIS — D47.3 ESSENTIAL (HEMORRHAGIC) THROMBOCYTHEMIA: ICD-10-CM

## 2017-05-08 DIAGNOSIS — Z88.0 ALLERGY STATUS TO PENICILLIN: ICD-10-CM

## 2017-05-08 DIAGNOSIS — Z91.040 LATEX ALLERGY STATUS: ICD-10-CM

## 2017-05-08 DIAGNOSIS — I50.30 UNSPECIFIED DIASTOLIC (CONGESTIVE) HEART FAILURE: ICD-10-CM

## 2017-05-08 DIAGNOSIS — Z95.2 PRESENCE OF PROSTHETIC HEART VALVE: ICD-10-CM

## 2017-05-08 DIAGNOSIS — R00.2 PALPITATIONS: ICD-10-CM

## 2017-05-08 DIAGNOSIS — R42 DIZZINESS AND GIDDINESS: ICD-10-CM

## 2017-05-08 DIAGNOSIS — E78.5 HYPERLIPIDEMIA, UNSPECIFIED: ICD-10-CM

## 2017-05-08 DIAGNOSIS — F41.9 ANXIETY DISORDER, UNSPECIFIED: ICD-10-CM

## 2017-05-08 DIAGNOSIS — R55 SYNCOPE AND COLLAPSE: ICD-10-CM

## 2017-05-08 DIAGNOSIS — E03.9 HYPOTHYROIDISM, UNSPECIFIED: ICD-10-CM

## 2017-05-08 DIAGNOSIS — H35.30 UNSPECIFIED MACULAR DEGENERATION: ICD-10-CM

## 2017-05-08 DIAGNOSIS — F12.90 CANNABIS USE, UNSPECIFIED, UNCOMPLICATED: ICD-10-CM

## 2017-05-18 ENCOUNTER — INPATIENT (INPATIENT)
Facility: HOSPITAL | Age: 82
LOS: 3 days | Discharge: HOME | DRG: 552 | End: 2017-05-22
Attending: INTERNAL MEDICINE | Admitting: INTERNAL MEDICINE
Payer: MEDICARE

## 2017-05-18 VITALS
TEMPERATURE: 96 F | SYSTOLIC BLOOD PRESSURE: 146 MMHG | HEART RATE: 90 BPM | DIASTOLIC BLOOD PRESSURE: 72 MMHG | RESPIRATION RATE: 16 BRPM | OXYGEN SATURATION: 98 %

## 2017-05-18 DIAGNOSIS — E03.9 HYPOTHYROIDISM, UNSPECIFIED: ICD-10-CM

## 2017-05-18 DIAGNOSIS — Z02.9 ENCOUNTER FOR ADMINISTRATIVE EXAMINATIONS, UNSPECIFIED: ICD-10-CM

## 2017-05-18 DIAGNOSIS — M48.54XA COLLAPSED VERTEBRA, NOT ELSEWHERE CLASSIFIED, THORACIC REGION, INITIAL ENCOUNTER FOR FRACTURE: ICD-10-CM

## 2017-05-18 DIAGNOSIS — I48.91 UNSPECIFIED ATRIAL FIBRILLATION: ICD-10-CM

## 2017-05-18 DIAGNOSIS — R55 SYNCOPE AND COLLAPSE: ICD-10-CM

## 2017-05-18 DIAGNOSIS — Z98.89 OTHER SPECIFIED POSTPROCEDURAL STATES: Chronic | ICD-10-CM

## 2017-05-18 DIAGNOSIS — I50.32 CHRONIC DIASTOLIC (CONGESTIVE) HEART FAILURE: ICD-10-CM

## 2017-05-18 DIAGNOSIS — R60.0 LOCALIZED EDEMA: ICD-10-CM

## 2017-05-18 DIAGNOSIS — D69.6 THROMBOCYTOPENIA, UNSPECIFIED: ICD-10-CM

## 2017-05-18 LAB
ALBUMIN SERPL ELPH-MCNC: 4 G/DL — SIGNIFICANT CHANGE UP (ref 3.3–5)
ALP SERPL-CCNC: 49 U/L — SIGNIFICANT CHANGE UP (ref 40–120)
ALT FLD-CCNC: 17 U/L — SIGNIFICANT CHANGE UP (ref 10–45)
ANION GAP SERPL CALC-SCNC: 15 MMOL/L — SIGNIFICANT CHANGE UP (ref 5–17)
APPEARANCE UR: CLEAR — SIGNIFICANT CHANGE UP
APTT BLD: 28.7 SEC — SIGNIFICANT CHANGE UP (ref 27.5–37.4)
AST SERPL-CCNC: 33 U/L — SIGNIFICANT CHANGE UP (ref 10–40)
BASOPHILS NFR BLD AUTO: 0.1 % — SIGNIFICANT CHANGE UP (ref 0–2)
BILIRUB SERPL-MCNC: 0.5 MG/DL — SIGNIFICANT CHANGE UP (ref 0.2–1.2)
BILIRUB UR-MCNC: NEGATIVE — SIGNIFICANT CHANGE UP
BUN SERPL-MCNC: 14 MG/DL — SIGNIFICANT CHANGE UP (ref 7–23)
CALCIUM SERPL-MCNC: 8.9 MG/DL — SIGNIFICANT CHANGE UP (ref 8.4–10.5)
CHLORIDE SERPL-SCNC: 99 MMOL/L — SIGNIFICANT CHANGE UP (ref 96–108)
CK MB CFR SERPL CALC: 1.8 NG/ML — SIGNIFICANT CHANGE UP (ref 0–6.7)
CK MB CFR SERPL CALC: 2 NG/ML — SIGNIFICANT CHANGE UP (ref 0–6.7)
CK SERPL-CCNC: 81 U/L — SIGNIFICANT CHANGE UP (ref 25–170)
CO2 SERPL-SCNC: 24 MMOL/L — SIGNIFICANT CHANGE UP (ref 22–31)
COLOR SPEC: YELLOW — SIGNIFICANT CHANGE UP
CREAT SERPL-MCNC: 0.9 MG/DL — SIGNIFICANT CHANGE UP (ref 0.5–1.3)
DIFF PNL FLD: NEGATIVE — SIGNIFICANT CHANGE UP
EOSINOPHIL NFR BLD AUTO: 0.2 % — SIGNIFICANT CHANGE UP (ref 0–6)
GLUCOSE SERPL-MCNC: 108 MG/DL — HIGH (ref 70–99)
GLUCOSE UR QL: NEGATIVE — SIGNIFICANT CHANGE UP
HCT VFR BLD CALC: 40.6 % — SIGNIFICANT CHANGE UP (ref 34.5–45)
HGB BLD-MCNC: 13.6 G/DL — SIGNIFICANT CHANGE UP (ref 11.5–15.5)
INR BLD: 1.21 — HIGH (ref 0.88–1.16)
KETONES UR-MCNC: (no result) MG/DL
LEUKOCYTE ESTERASE UR-ACNC: NEGATIVE — SIGNIFICANT CHANGE UP
LYMPHOCYTES # BLD AUTO: 10 % — LOW (ref 13–44)
MCHC RBC-ENTMCNC: 29.5 PG — SIGNIFICANT CHANGE UP (ref 27–34)
MCHC RBC-ENTMCNC: 33.5 G/DL — SIGNIFICANT CHANGE UP (ref 32–36)
MCV RBC AUTO: 88.1 FL — SIGNIFICANT CHANGE UP (ref 80–100)
MONOCYTES NFR BLD AUTO: 4.7 % — SIGNIFICANT CHANGE UP (ref 2–14)
NEUTROPHILS NFR BLD AUTO: 85 % — HIGH (ref 43–77)
NITRITE UR-MCNC: NEGATIVE — SIGNIFICANT CHANGE UP
PH UR: 6 — SIGNIFICANT CHANGE UP (ref 5–8)
PLATELET # BLD AUTO: 15 K/UL — CRITICAL LOW (ref 150–400)
POTASSIUM SERPL-MCNC: 4.4 MMOL/L — SIGNIFICANT CHANGE UP (ref 3.5–5.3)
POTASSIUM SERPL-SCNC: 4.4 MMOL/L — SIGNIFICANT CHANGE UP (ref 3.5–5.3)
PROT SERPL-MCNC: 7.2 G/DL — SIGNIFICANT CHANGE UP (ref 6–8.3)
PROT UR-MCNC: NEGATIVE MG/DL — SIGNIFICANT CHANGE UP
PROTHROM AB SERPL-ACNC: 13.5 SEC — HIGH (ref 9.8–12.7)
RBC # BLD: 4.61 M/UL — SIGNIFICANT CHANGE UP (ref 3.8–5.2)
RBC # FLD: 13.6 % — SIGNIFICANT CHANGE UP (ref 10.3–16.9)
SODIUM SERPL-SCNC: 138 MMOL/L — SIGNIFICANT CHANGE UP (ref 135–145)
SP GR SPEC: 1.01 — SIGNIFICANT CHANGE UP (ref 1–1.03)
TROPONIN T SERPL-MCNC: 0.01 NG/ML — SIGNIFICANT CHANGE UP (ref 0–0.01)
TROPONIN T SERPL-MCNC: 0.01 NG/ML — SIGNIFICANT CHANGE UP (ref 0–0.01)
UROBILINOGEN FLD QL: 0.2 E.U./DL — SIGNIFICANT CHANGE UP
WBC # BLD: 10.9 K/UL — HIGH (ref 3.8–10.5)
WBC # FLD AUTO: 10.9 K/UL — HIGH (ref 3.8–10.5)

## 2017-05-18 PROCEDURE — 74176 CT ABD & PELVIS W/O CONTRAST: CPT | Mod: 26

## 2017-05-18 PROCEDURE — 93010 ELECTROCARDIOGRAM REPORT: CPT

## 2017-05-18 PROCEDURE — 99231 SBSQ HOSP IP/OBS SF/LOW 25: CPT

## 2017-05-18 PROCEDURE — 99223 1ST HOSP IP/OBS HIGH 75: CPT

## 2017-05-18 PROCEDURE — 72070 X-RAY EXAM THORAC SPINE 2VWS: CPT | Mod: 26

## 2017-05-18 PROCEDURE — 72100 X-RAY EXAM L-S SPINE 2/3 VWS: CPT | Mod: 26

## 2017-05-18 PROCEDURE — 99285 EMERGENCY DEPT VISIT HI MDM: CPT | Mod: 25

## 2017-05-18 RX ORDER — MECLIZINE HCL 12.5 MG
12.5 TABLET ORAL THREE TIMES A DAY
Qty: 0 | Refills: 0 | Status: DISCONTINUED | OUTPATIENT
Start: 2017-05-18 | End: 2017-05-22

## 2017-05-18 RX ORDER — SODIUM CHLORIDE 9 MG/ML
1000 INJECTION INTRAMUSCULAR; INTRAVENOUS; SUBCUTANEOUS
Qty: 0 | Refills: 0 | Status: DISCONTINUED | OUTPATIENT
Start: 2017-05-18 | End: 2017-05-18

## 2017-05-18 RX ORDER — MECLIZINE HCL 12.5 MG
1 TABLET ORAL
Qty: 0 | Refills: 0 | COMMUNITY

## 2017-05-18 RX ORDER — SODIUM CHLORIDE 9 MG/ML
500 INJECTION INTRAMUSCULAR; INTRAVENOUS; SUBCUTANEOUS ONCE
Qty: 0 | Refills: 0 | Status: COMPLETED | OUTPATIENT
Start: 2017-05-18 | End: 2017-05-18

## 2017-05-18 RX ORDER — ACETAMINOPHEN 500 MG
650 TABLET ORAL ONCE
Qty: 0 | Refills: 0 | Status: COMPLETED | OUTPATIENT
Start: 2017-05-18 | End: 2017-05-18

## 2017-05-18 RX ORDER — ESTROGENS, CONJUGATED 0.625 MG
1 TABLET ORAL
Qty: 0 | Refills: 0 | COMMUNITY

## 2017-05-18 RX ORDER — ACETAMINOPHEN 500 MG
1000 TABLET ORAL ONCE
Qty: 0 | Refills: 0 | Status: COMPLETED | OUTPATIENT
Start: 2017-05-18 | End: 2017-05-18

## 2017-05-18 RX ORDER — ESTROGENS, CONJUGATED 0.625 MG
0.3 TABLET ORAL DAILY
Qty: 0 | Refills: 0 | Status: DISCONTINUED | OUTPATIENT
Start: 2017-05-18 | End: 2017-05-22

## 2017-05-18 RX ORDER — LEVOTHYROXINE SODIUM 125 MCG
50 TABLET ORAL DAILY
Qty: 0 | Refills: 0 | Status: DISCONTINUED | OUTPATIENT
Start: 2017-05-19 | End: 2017-05-22

## 2017-05-18 RX ORDER — FLURAZEPAM HCL 15 MG
1 CAPSULE ORAL
Qty: 0 | Refills: 0 | COMMUNITY

## 2017-05-18 RX ADMIN — Medication 12.5 MILLIGRAM(S): at 23:24

## 2017-05-18 RX ADMIN — Medication 1000 MILLIGRAM(S): at 14:57

## 2017-05-18 RX ADMIN — Medication 0.3 MILLIGRAM(S): at 23:24

## 2017-05-18 RX ADMIN — SODIUM CHLORIDE 125 MILLILITER(S): 9 INJECTION INTRAMUSCULAR; INTRAVENOUS; SUBCUTANEOUS at 14:57

## 2017-05-18 RX ADMIN — SODIUM CHLORIDE 1000 MILLILITER(S): 9 INJECTION INTRAMUSCULAR; INTRAVENOUS; SUBCUTANEOUS at 14:57

## 2017-05-18 RX ADMIN — Medication 650 MILLIGRAM(S): at 21:53

## 2017-05-18 RX ADMIN — Medication 650 MILLIGRAM(S): at 22:53

## 2017-05-18 NOTE — ED PROVIDER NOTE - PROGRESS NOTE DETAILS
Pt aware of thrombocytopenia for the past 8 months, monitoring. Pt reports she lives at home w/ , no home / VNS services. No family. Pt lives in walk-up. Will have PT eval the pt Pt now stating she did not pass out (not from head injury), that she did in fact syncopize. Recent admission to cardio / tele 2 weeks ago for syncope to Dr Toledo (was on service at that time). D/w Dr Toledo - he thinks pt should be re-admitted to tele, but will not be in hospital. D/w cardio on call Dr Sultana, will admit to his service. He is aware of thrombocytopenia and t12 compression fx Pt aware of thrombocytopenia for the past 8 months, monitoring. Pt reports she lives at home w/ , no home / VNS services. No family. Pt lives in walk-up.

## 2017-05-18 NOTE — H&P ADULT - PROBLEM SELECTOR PLAN 3
- platelets previously 30-60K on prior admission with plan for bone marrow biopsy as an outpatient; Dr. Kyle re-consulted follow up recs  - stop eliquis 2/2 platelet count per Dr. Kyle

## 2017-05-18 NOTE — ED PROVIDER NOTE - MEDICAL DECISION MAKING DETAILS
Pt presents s/p fall to buttock, s/p lightheadedness. Pt denies LOC. Pt w/ hx vasovagal syndrome. Check orthostatics, IVF, EKG, monitor in ED. XR to r/o fx / dislocation. Analgesia. Dispo pending w/u and clinical status

## 2017-05-18 NOTE — H&P ADULT - PROBLEM SELECTOR PLAN 5
- currently euvolemic  - daily weights and I&Os  - echo 5/2 revealed EF 60-65%, LA mildly dilated, interatrial septal aneurysm, mild AI, mitral ring in place with mild-mod MR

## 2017-05-18 NOTE — ED ADULT NURSE REASSESSMENT NOTE - NS ED NURSE REASSESS COMMENT FT1
patient is comfortably on bed, food is offered, seen by  and Physiotherapist, for admission waiting for bed.

## 2017-05-18 NOTE — ED PROVIDER NOTE - CARE PLAN
Principal Discharge DX:	Syncope, unspecified syncope type  Secondary Diagnosis:	Thrombocytopenia  Secondary Diagnosis:	Compression fracture of twelfth thoracic vertebra, initial encounter

## 2017-05-18 NOTE — H&P ADULT - NEGATIVE PSYCHIATRIC SYMPTOMS
no suicidal ideation/no insomnia/no paranoia/no memory loss/no mood swings/no agitation/no auditory hallucinations/no depression/no hyperactivity/no visual hallucinations

## 2017-05-18 NOTE — H&P ADULT - ASSESSMENT
Patient is an 84yo F, occasional marijuana user, with PMHx afib (on Eliquis, s/p failed DCCV with amiodarone and subsequent conversion to NSR), hypothyroidism, hypotension, vasovagal syncope, macular degeneration s/p b/l eye injections, mitral valve repair in 2004, chronic diastolic heart failure (EF 55%, mild-moderate AR/MR) with recent admission at [facility] 4/30-5/2 for syncope workup who now presents to [facility] ED 5/18/17 complaining of syncopal episode. Additionally, patient was found to have plt 15K and CT abd/pelvis revealed acute compression fracture of the T12 vertebral body with retropulsion of the superior posterior endplate into the spinal canal, no evidence of retroperitoneal hematoma. Patient is now admitted to [facility] for monitoring on telemetry and further workup.

## 2017-05-18 NOTE — H&P ADULT - PROBLEM SELECTOR PLAN 1
- patient not lightheaded/dizzy at this time, HD stable  - monitor on telemetry, trend trops  - follow up MRI brain given patient is on eliquis with plt count of 15K  - patient seems agreeable to loop recorder at this time, follow up with Dr. Menchaca regarding loop recorder in AM (refused on prior admission)  - Dr. Caballero called and left message, follow up in AM

## 2017-05-18 NOTE — H&P ADULT - NEGATIVE NEUROLOGICAL SYMPTOMS
no generalized seizures/no weakness/no loss of sensation/no facial palsy/no transient paralysis/no tremors/no focal seizures/no hemiparesis/no confusion/no paresthesias

## 2017-05-18 NOTE — ED PROVIDER NOTE - OBJECTIVE STATEMENT
PMHx AF (on Eliquis, s/p failed DCCV with amiodarone and subsequent conversion to NSR), hypothyroidism, hypotension, vasovagal syncope, mitral valve repair in 2004, dCHFEF 55%, mild-moderate AR/MR), recent admission 4/30-5/2, presents s/p fall today. Pt reports she normally takes her Synthroid, waits and then takes her Eliquis, however this morning, she took them together. Pt was standing in the kitchen when she felt lightheaded, described as "something washing over me." No associated CP, SOB, or palpitations. Pt fell to the ground, landing on her buttock. No head injury or LOC. After falling pt felt sweaty. No n/v. Pt was able to pull herself up. Pt began feeling pain in the lower back, and decided to call the ambulance. Pt was able to walk down 3 flights of stairs to get to the ambulance. No HA, neck pain, parasthesias or focal weakness. No abdominal pain. No other complaints.

## 2017-05-18 NOTE — H&P ADULT - PROBLEM SELECTOR PLAN 8
- Patient reports she currently lives at home with her  who she is caring for s/p back surgery. Patient states they live in a 3 floor walk up and are afraid to leave their home as he is still recovering and she is dizzy. Patient has two older sisters who live in [place] and her eventual plan is to re-locate there.

## 2017-05-18 NOTE — H&P ADULT - NSHPLABSRESULTS_GEN_ALL_CORE
13.6   10.9  )-----------( 15       ( 18 May 2017 14:10 )             40.6       05-18    138  |  99  |  14  ----------------------------<  108<H>  4.4   |  24  |  0.90    Ca    8.9      18 May 2017 14:10    TPro  7.2  /  Alb  4.0  /  TBili  0.5  /  DBili  x   /  AST  33  /  ALT  17  /  AlkPhos  49  05-18      PT/INR - ( 18 May 2017 14:10 )   PT: 13.5 sec;   INR: 1.21          PTT - ( 18 May 2017 14:10 )  PTT:28.7 sec    CARDIAC MARKERS ( 18 May 2017 14:10 )  x     / 0.01 ng/mL / 55 U/L / x     / 1.8 ng/mL        Urinalysis Basic - ( 18 May 2017 16:18 )    Color: Yellow / Appearance: Clear / S.010 / pH: x  Gluc: x / Ketone: Trace mg/dL  / Bili: NEGATIVE / Urobili: 0.2 E.U./dL   Blood: x / Protein: NEGATIVE mg/dL / Nitrite: NEGATIVE   Leuk Esterase: NEGATIVE / RBC: x / WBC x   Sq Epi: x / Non Sq Epi: x / Bacteria: x        EKG: NSR with no ischemic changes

## 2017-05-18 NOTE — H&P ADULT - PROBLEM SELECTOR PLAN 2
- patient complaining of paraspinal tenderness at T12, strength and sensation intact  - neurosurgery consulted, follow up recs - patient complaining of paraspinal tenderness at T12, strength and sensation intact  - neurosurgery consulted, follow up recs  - WBC likely in the setting of fx, patient afebrile

## 2017-05-18 NOTE — H&P ADULT - NEGATIVE CARDIOVASCULAR SYMPTOMS
no orthopnea/no claudication/no chest pain/no paroxysmal nocturnal dyspnea/no palpitations/no dyspnea on exertion

## 2017-05-18 NOTE — H&P ADULT - PROBLEM SELECTOR PLAN 4
- currently in NSR with HR in 90s  - patient refused metoprolol/amio on prior admission; stop celine per Dr. Kyle

## 2017-05-18 NOTE — ED ADULT TRIAGE NOTE - CHIEF COMPLAINT QUOTE
Patient brought in by ambulance from home for evaluation of syncope, fall and new onset lumbar back pain. Patient says has been having frequent dizzy episodes and was recently admitted for same.

## 2017-05-18 NOTE — H&P ADULT - HISTORY OF PRESENT ILLNESS
Patient is an 84yo F, occasional marijuana user, with PMHx afib (on Eliquis, s/p failed DCCV with amiodarone and subsequent conversion to NSR), hypothyroidism, hypotension, vasovagal syncope, mitral valve repair in 2004, chronic diastolic heart failure (EF 55%, mild-moderate AR/MR) with recent admission at [facility] 4/30-5/2 for syncope workup who now presents to [facility] ED 5/18/17 complaining of syncopal episode. On the prior admission, patient troponin trend 0.115->0.121 -> 0.129. Hematology, Dr. Kyle consulted for low platelet count and patient confirmed to have thrombocytopenia with platelet count of 60. Patient was recommended for outpatient bone marrow biopsy with Dr. Kyle. Dr. Menchaca, EP, consulted who recommended loop recorder placement, BB and amio. However, patient refusing all. Patient underwent echocardiogram revealing EF 60-65%, LA mildly dilated, interatrial septal aneurysm, mild AI, mitral ring in place with mild-mod MR. Carotid U/S also performed revealing no significant stenosis. Physical Therapy evaluated pt and no home needs but may f/u with her Cardiologist regarding possible Vestibular rehab. Patient now reports... Patient is an 86yo F, occasional marijuana user, with PMHx afib (on Eliquis, s/p failed DCCV with amiodarone and subsequent conversion to NSR), hypothyroidism, hypotension, vasovagal syncope, macular degeneration s/p b/l eye injections, mitral valve repair in 2004, chronic diastolic heart failure (EF 55%, mild-moderate AR/MR) with recent admission at [facility] 4/30-5/2 for syncope workup who now presents to [facility] ED 5/18/17 complaining of syncopal episode. On the prior admission, patient troponin trend 0.115->0.121 -> 0.129. Hematology, Dr. Kyle consulted for low platelet count and patient confirmed to have thrombocytopenia with platelet count of 60. Patient was recommended for outpatient bone marrow biopsy with Dr. Kyle. Dr. Menchaca, EP, consulted who recommended loop recorder placement, BB and amio. However, patient refusing all. Patient underwent echocardiogram revealing EF 60-65%, LA mildly dilated, interatrial septal aneurysm, mild AI, mitral ring in place with mild-mod MR. Carotid U/S also performed revealing no significant stenosis. Physical Therapy evaluated pt and no home needs but may f/u with her Cardiologist regarding possible Vestibular rehab. Patient now reports she was standing in her kitchen at the counter, felt "flushed" and lost consciousness. Patient believes she slid down between two cabinets and fell on her but/lower back. She awoke on the floor in the sitting position. Patient also reports b/l ocular migraines with + photophobia that come and go on their own for the last week. Of note, patient recently stopped her meclizine yesterday and reports that it did improve her dizziness somewhat. Patient saw a neurologist Dr. Kessler, who recommended she follow up with an MRI brain. She also saw and ENT doctor who recommended a balance test which she was unable to get an appointment for until June. Patient denies fatigue, SOB, PND, orthopnea, N/V, hematochezia, melena. On arrival to the ED, VS  Temp 96.5F, HR 90bpm, /72, RR 16, O2 sat 99% RA, orthostatics positive from sitting to standing. Labs significant for WBC 10.9 with left shift, platelets 15K with stable Hgb/Hct, trops neg x 1. UA negative. CT abd/pelvis revealed acute compression fracture of the T12 vertebral body with retropulsion of the superior posterior endplate into the spinal canal, no evidence of retroperitoneal hematoma. Patient is now admitted to [facility] for monitoring on telemetry and further workup.

## 2017-05-18 NOTE — CONSULT NOTE ADULT - PROBLEM SELECTOR RECOMMENDATION 9
No acute Neurosurgical intervention is indicated at this time,  MRI w/o cont T-spine,   TLSO brace to be ordered,   Ambulate with Assistance,   Pain Management consult  D/w Dr. Kulkarni

## 2017-05-18 NOTE — H&P ADULT - PROBLEM SELECTOR PLAN 7
- patient reports LLE edema is chronic and has always been more swollen than right; she states she has had LE duplexes in the past all negative

## 2017-05-18 NOTE — ED PROVIDER NOTE - CONSTITUTIONAL, MLM
normal... Thin, frail, elderly, awake, alert, oriented to person, place, time/situation and in no apparent distress.

## 2017-05-18 NOTE — ED PROVIDER NOTE - MUSCULOSKELETAL, MLM
Spine appears normal, range of motion is not limited, no muscle or joint tenderness throughout the spine or in the paraspinal region. Pelvis stable. + b/l FROM w/o pain. no deformity or signs of trauma. NVI distally

## 2017-05-18 NOTE — CONSULT NOTE ADULT - SUBJECTIVE AND OBJECTIVE BOX
HPI:  Patient is an 86yo F, occasional marijuana user, with PMHx afib (on Eliquis, s/p failed DCCV with amiodarone and subsequent conversion to NSR), hypothyroidism, hypotension, vasovagal syncope, macular degeneration s/p b/l eye injections, mitral valve repair in 2004, chronic diastolic heart failure (EF 55%, mild-moderate AR/MR) with recent admission at [facility] 4/30-5/2 for syncope workup who now presents to [facility] ED 5/18/17 complaining of syncopal episode. On the prior admission, patient troponin trend 0.115->0.121 -> 0.129. Hematology, Dr. Kyle consulted for low platelet count and patient confirmed to have thrombocytopenia with platelet count of 60. Patient was recommended for outpatient bone marrow biopsy with Dr. Kyle. Dr. Menchaca, EP, consulted who recommended loop recorder placement, BB and amio. However, patient refusing all. Patient underwent echocardiogram revealing EF 60-65%, LA mildly dilated, interatrial septal aneurysm, mild AI, mitral ring in place with mild-mod MR. Carotid U/S also performed revealing no significant stenosis. Physical Therapy evaluated pt and no home needs but may f/u with her Cardiologist regarding possible Vestibular rehab. Patient now reports she was standing in her kitchen at the counter, felt "flushed" and lost consciousness. Patient believes she slid down between two cabinets and fell on her but/lower back. She awoke on the floor in the sitting position. Patient also reports b/l ocular migraines with + photophobia that come and go on their own for the last week. Of note, patient recently stopped her meclizine yesterday and reports that it did improve her dizziness somewhat. Patient saw a neurologist Dr. Kessler, who recommended she follow up with an MRI brain. She also saw and ENT doctor who recommended a balance test which she was unable to get an appointment for until June. Patient denies fatigue, SOB, PND, orthopnea, N/V, hematochezia, melena. On arrival to the ED, VS  Temp 96.5F, HR 90bpm, /72, RR 16, O2 sat 99% RA, orthostatics positive from sitting to standing. Labs significant for WBC 10.9 with left shift, platelets 15K with stable Hgb/Hct, trops neg x 1. UA negative. CT abd/pelvis revealed acute compression fracture of the T12 vertebral body with retropulsion of the superior posterior endplate into the spinal canal, no evidence of retroperitoneal hematoma. Patient is now admitted to [facility] for monitoring on telemetry and further workup. (18 May 2017 17:51)    Neurosurgery was consulted for T12 compression fracture  Pt denies sob, cp, n/v, acute numbness or weakness, saddle paresthesia, foot drop, acute bladder/bowel dysfunction, neck pain    CT Abd & pelvis: Acute compression fracture of the T12 vertebral body with retropulsion   of the superior posterior endplate into the spinal canal    Exam:  AA&Ox3, NAD, clear coherent speech,  CNs II-XII grossly intact  neck supple,  motor 5/5 x 4 extr, no drift, no dysmetria,  sensation to LT grossly intact in all dermatomes,  + point tenderness Lower Thoracic Spine,

## 2017-05-18 NOTE — ED ADULT NURSE REASSESSMENT NOTE - NS ED NURSE REASSESS COMMENT FT1
patient feels better compared before, vitals are checked, for possible admission. labs are reviewed. will continue to monitor.

## 2017-05-19 DIAGNOSIS — S22.000A WEDGE COMPRESSION FRACTURE OF UNSPECIFIED THORACIC VERTEBRA, INITIAL ENCOUNTER FOR CLOSED FRACTURE: ICD-10-CM

## 2017-05-19 LAB
ANION GAP SERPL CALC-SCNC: 11 MMOL/L — SIGNIFICANT CHANGE UP (ref 5–17)
BUN SERPL-MCNC: 10 MG/DL — SIGNIFICANT CHANGE UP (ref 7–23)
CALCIUM SERPL-MCNC: 8.7 MG/DL — SIGNIFICANT CHANGE UP (ref 8.4–10.5)
CHLORIDE SERPL-SCNC: 102 MMOL/L — SIGNIFICANT CHANGE UP (ref 96–108)
CO2 SERPL-SCNC: 27 MMOL/L — SIGNIFICANT CHANGE UP (ref 22–31)
CREAT SERPL-MCNC: 0.7 MG/DL — SIGNIFICANT CHANGE UP (ref 0.5–1.3)
GLUCOSE SERPL-MCNC: 93 MG/DL — SIGNIFICANT CHANGE UP (ref 70–99)
HCT VFR BLD CALC: 42.5 % — SIGNIFICANT CHANGE UP (ref 34.5–45)
HCT VFR BLD CALC: 43.7 % — SIGNIFICANT CHANGE UP (ref 34.5–45)
HGB BLD-MCNC: 14.2 G/DL — SIGNIFICANT CHANGE UP (ref 11.5–15.5)
HGB BLD-MCNC: 14.8 G/DL — SIGNIFICANT CHANGE UP (ref 11.5–15.5)
MAGNESIUM SERPL-MCNC: 2 MG/DL — SIGNIFICANT CHANGE UP (ref 1.6–2.6)
MCHC RBC-ENTMCNC: 29.5 PG — SIGNIFICANT CHANGE UP (ref 27–34)
MCHC RBC-ENTMCNC: 30 PG — SIGNIFICANT CHANGE UP (ref 27–34)
MCHC RBC-ENTMCNC: 33.4 G/DL — SIGNIFICANT CHANGE UP (ref 32–36)
MCHC RBC-ENTMCNC: 33.9 G/DL — SIGNIFICANT CHANGE UP (ref 32–36)
MCV RBC AUTO: 88.2 FL — SIGNIFICANT CHANGE UP (ref 80–100)
MCV RBC AUTO: 88.5 FL — SIGNIFICANT CHANGE UP (ref 80–100)
PLATELET # BLD AUTO: 107 K/UL — LOW (ref 150–400)
PLATELET # BLD AUTO: 92 K/UL — LOW (ref 150–400)
POTASSIUM SERPL-MCNC: 4.2 MMOL/L — SIGNIFICANT CHANGE UP (ref 3.5–5.3)
POTASSIUM SERPL-SCNC: 4.2 MMOL/L — SIGNIFICANT CHANGE UP (ref 3.5–5.3)
RBC # BLD: 4.82 M/UL — SIGNIFICANT CHANGE UP (ref 3.8–5.2)
RBC # BLD: 4.94 M/UL — SIGNIFICANT CHANGE UP (ref 3.8–5.2)
RBC # FLD: 13.6 % — SIGNIFICANT CHANGE UP (ref 10.3–16.9)
RBC # FLD: 13.7 % — SIGNIFICANT CHANGE UP (ref 10.3–16.9)
SODIUM SERPL-SCNC: 140 MMOL/L — SIGNIFICANT CHANGE UP (ref 135–145)
T4 AB SER-ACNC: 10.46 UG/DL — SIGNIFICANT CHANGE UP (ref 3.17–11.72)
TROPONIN T SERPL-MCNC: 0 NG/ML — SIGNIFICANT CHANGE UP (ref 0–0.01)
TSH SERPL-MCNC: 2.91 UIU/ML — SIGNIFICANT CHANGE UP (ref 0.35–4.94)
WBC # BLD: 10.8 K/UL — HIGH (ref 3.8–10.5)
WBC # BLD: 8 K/UL — SIGNIFICANT CHANGE UP (ref 3.8–10.5)
WBC # FLD AUTO: 10.8 K/UL — HIGH (ref 3.8–10.5)
WBC # FLD AUTO: 8 K/UL — SIGNIFICANT CHANGE UP (ref 3.8–10.5)

## 2017-05-19 PROCEDURE — 72146 MRI CHEST SPINE W/O DYE: CPT | Mod: 26

## 2017-05-19 PROCEDURE — 70551 MRI BRAIN STEM W/O DYE: CPT | Mod: 26

## 2017-05-19 PROCEDURE — 99233 SBSQ HOSP IP/OBS HIGH 50: CPT

## 2017-05-19 RX ORDER — DOCUSATE SODIUM 100 MG
100 CAPSULE ORAL
Qty: 0 | Refills: 0 | Status: DISCONTINUED | OUTPATIENT
Start: 2017-05-19 | End: 2017-05-22

## 2017-05-19 RX ORDER — SENNA PLUS 8.6 MG/1
2 TABLET ORAL AT BEDTIME
Qty: 0 | Refills: 0 | Status: DISCONTINUED | OUTPATIENT
Start: 2017-05-19 | End: 2017-05-22

## 2017-05-19 RX ADMIN — Medication 12.5 MILLIGRAM(S): at 16:31

## 2017-05-19 RX ADMIN — SENNA PLUS 2 TABLET(S): 8.6 TABLET ORAL at 21:22

## 2017-05-19 RX ADMIN — Medication 100 MILLIGRAM(S): at 18:09

## 2017-05-19 RX ADMIN — Medication 12.5 MILLIGRAM(S): at 21:22

## 2017-05-19 RX ADMIN — Medication 50 MICROGRAM(S): at 06:18

## 2017-05-19 RX ADMIN — Medication 12.5 MILLIGRAM(S): at 07:58

## 2017-05-19 RX ADMIN — Medication 0.3 MILLIGRAM(S): at 21:22

## 2017-05-19 NOTE — PHYSICAL THERAPY INITIAL EVALUATION ADULT - CRITERIA FOR SKILLED THERAPEUTIC INTERVENTIONS
anticipated equipment needs at discharge/impairments found/rehab potential/risk reduction/prevention/functional limitations in following categories/therapy frequency

## 2017-05-19 NOTE — PHYSICAL THERAPY INITIAL EVALUATION ADULT - PERTINENT HX OF CURRENT PROBLEM, REHAB EVAL
86 year old female with recent admission at [facility] 4/30-5/2 for syncope workup who now presents to [facility] ED 5/18/17 complaining of syncopal episode.

## 2017-05-19 NOTE — PROGRESS NOTE ADULT - PROBLEM SELECTOR PLAN 3
I/O, daily weight. Echo 5/2: EF 65%, intraatrial septal aneurysm, mild AI, mitral ring in place with mild-mod MR. Pt has chronic LLE>RLE, LE duplexes in past have been negative.

## 2017-05-19 NOTE — PROGRESS NOTE ADULT - ASSESSMENT
84 y/o female PMHx A Fib, hypothyroidism, hypotension, syncope, MV repair, diastolic CHF, thrombocytopenia, admitted with recurrent episode of syncope.

## 2017-05-19 NOTE — PHYSICAL THERAPY INITIAL EVALUATION ADULT - GAIT DEVIATIONS NOTED, PT EVAL
decreased weight-shifting ability/decreased belia/decreased step length/increased time in double stance

## 2017-05-19 NOTE — CONSULT NOTE ADULT - SUBJECTIVE AND OBJECTIVE BOX
HPI:  Patient is an 84yo F, occasional marijuana user, with PMHx afib (on Eliquis, s/p failed DCCV with amiodarone and subsequent conversion to NSR), hypothyroidism, hypotension, vasovagal syncope, macular degeneration s/p b/l eye injections, mitral valve repair in 2004, chronic diastolic heart failure (EF 55%, mild-moderate AR/MR) with recent admission at [facility] 4/30-5/2 for syncope workup who now presents to [facility] ED 5/18/17 complaining of syncopal episode. On the prior admission, patient troponin trend 0.115->0.121 -> 0.129. Hematology, Dr. Kyle consulted for low platelet count and patient confirmed to have thrombocytopenia with platelet count of 60. Patient was recommended for outpatient bone marrow biopsy with Dr. Kyle. Dr. Menchaca, EP, consulted who recommended loop recorder placement, BB and amio. However, patient refusing all. Patient underwent echocardiogram revealing EF 60-65%, LA mildly dilated, interatrial septal aneurysm, mild AI, mitral ring in place with mild-mod MR. Carotid U/S also performed revealing no significant stenosis. Physical Therapy evaluated pt and no home needs but may f/u with her Cardiologist regarding possible Vestibular rehab. Patient now reports she was standing in her kitchen at the counter, felt "flushed" and lost consciousness. Patient believes she slid down between two cabinets and fell on her but/lower back. She awoke on the floor in the sitting position. Patient also reports b/l ocular migraines with + photophobia that come and go on their own for the last week. Of note, patient recently stopped her meclizine yesterday and reports that it did improve her dizziness somewhat. Patient saw a neurologist Dr. Kessler, who recommended she follow up with an MRI brain. She also saw and ENT doctor who recommended a balance test which she was unable to get an appointment for until June. Patient denies fatigue, SOB, PND, orthopnea, N/V, hematochezia, melena. On arrival to the ED, VS  Temp 96.5F, HR 90bpm, /72, RR 16, O2 sat 99% RA, orthostatics positive from sitting to standing. Labs significant for WBC 10.9 with left shift, platelets 15K with stable Hgb/Hct, trops neg x 1. UA negative. CT abd/pelvis revealed acute compression fracture of the T12 vertebral body with retropulsion of the superior posterior endplate into the spinal canal, no evidence of retroperitoneal hematoma. Patient is now admitted to [facility] for monitoring on telemetry and further workup. (18 May 2017 17:51)    FAMILY HISTORY:    MEDICATIONS  (STANDING):  meclizine 12.5milliGRAM(s) Oral three times a day  levothyroxine 50MICROGram(s) Oral daily  estrogens    conjugated 0.3milliGRAM(s) Oral daily    MEDICATIONS  (PRN):  oxyCODONE  5 mG/acetaminophen 325 mG 1Tablet(s) Oral every 4 hours PRN Moderate Pain (4 - 6)    Vital Signs Last 24 Hrs  T(C): 36.1, Max: 36.7 (05-19 @ 01:00)  T(F): 97, Max: 98 (05-19 @ 01:00)  HR: 82 (75 - 90)  BP: 135/63 (122/58 - 146/72)  BP(mean): 96 (96 - 96)  RR: 16 (16 - 18)  SpO2: 98% (96% - 99%)    Physical exam:    Overall impression  Lymphadenopathy  Liver  spleen    Labs:  CBC Full  -  ( 19 May 2017 11:35 )  WBC Count : 10.8 K/uL  Hemoglobin : 14.8 g/dL  Hematocrit : 43.7 %  Platelet Count - Automated : 107 K/uL  Mean Cell Volume : 88.5 fL  Mean Cell Hemoglobin : 30.0 pg  Mean Cell Hemoglobin Concentration : 33.9 g/dL  Auto Neutrophil # : x  Auto Lymphocyte # : x  Auto Monocyte # : x  Auto Eosinophil # : x  Auto Basophil # : x  Auto Neutrophil % : x  Auto Lymphocyte % : x  Auto Monocyte % : x  Auto Eosinophil % : x  Auto Basophil % : x    05-19    140  |  102  |  10  ----------------------------<  93  4.2   |  27  |  0.70    Ca    8.7      19 May 2017 07:20  Mg     2.0     05-19    TPro  7.2  /  Alb  4.0  /  TBili  0.5  /  DBili  x   /  AST  33  /  ALT  17  /  AlkPhos  49  05-18      Radiology:  HEALTH ISSUES - R/O PROBLEM Dx:      Assessmant / Problems  Patient known to me for thrombocytopenia  There is no active bleeding , platelets 92k  CBC normal  Refuses again to have bone marrow exam      Plan:  Restart Eliquis    Thank you  Mitzi Kyle MD

## 2017-05-19 NOTE — PROGRESS NOTE ADULT - PROBLEM SELECTOR PLAN 1
Left message for Dr. Caballero to consult, awaiting recs. MRI Brain ordered. No plan for loop recorder per EP, no further workup needed, team signed off. Left message for Dr. Caballero to consult, awaiting recs. MRI Brain ordered. No plan for loop recorder per EP, no further workup needed, team signed off. PT ordered.  Pt had positive orthostatic vitals in ED. Will repeat today. MRI Brain ordered. Possible neuro S[facility] consult this weekend if MRI brain abnormal. No plan for loop recorder per EP, no further workup needed, team signed off. PT ordered.  Pt had positive orthostatic vitals in ED. Will repeat today.

## 2017-05-20 LAB
ANION GAP SERPL CALC-SCNC: 9 MMOL/L — SIGNIFICANT CHANGE UP (ref 5–17)
BUN SERPL-MCNC: 10 MG/DL — SIGNIFICANT CHANGE UP (ref 7–23)
CALCIUM SERPL-MCNC: 8.6 MG/DL — SIGNIFICANT CHANGE UP (ref 8.4–10.5)
CHLORIDE SERPL-SCNC: 100 MMOL/L — SIGNIFICANT CHANGE UP (ref 96–108)
CLOSURE TME COLL+EPINEP BLD: 91 K/UL — LOW (ref 150–400)
CO2 SERPL-SCNC: 27 MMOL/L — SIGNIFICANT CHANGE UP (ref 22–31)
CREAT SERPL-MCNC: 0.7 MG/DL — SIGNIFICANT CHANGE UP (ref 0.5–1.3)
GLUCOSE SERPL-MCNC: 100 MG/DL — HIGH (ref 70–99)
HCT VFR BLD CALC: 39.9 % — SIGNIFICANT CHANGE UP (ref 34.5–45)
HGB BLD-MCNC: 13.6 G/DL — SIGNIFICANT CHANGE UP (ref 11.5–15.5)
MAGNESIUM SERPL-MCNC: 2.1 MG/DL — SIGNIFICANT CHANGE UP (ref 1.6–2.6)
MCHC RBC-ENTMCNC: 29.8 PG — SIGNIFICANT CHANGE UP (ref 27–34)
MCHC RBC-ENTMCNC: 34.1 G/DL — SIGNIFICANT CHANGE UP (ref 32–36)
MCV RBC AUTO: 87.5 FL — SIGNIFICANT CHANGE UP (ref 80–100)
PLATELET # BLD AUTO: 35 K/UL — LOW (ref 150–400)
POTASSIUM SERPL-MCNC: 4.4 MMOL/L — SIGNIFICANT CHANGE UP (ref 3.5–5.3)
POTASSIUM SERPL-SCNC: 4.4 MMOL/L — SIGNIFICANT CHANGE UP (ref 3.5–5.3)
RBC # BLD: 4.56 M/UL — SIGNIFICANT CHANGE UP (ref 3.8–5.2)
RBC # FLD: 13.7 % — SIGNIFICANT CHANGE UP (ref 10.3–16.9)
SODIUM SERPL-SCNC: 136 MMOL/L — SIGNIFICANT CHANGE UP (ref 135–145)
T3 SERPL-MCNC: 108 NG/DL — SIGNIFICANT CHANGE UP (ref 80–200)
WBC # BLD: 8.5 K/UL — SIGNIFICANT CHANGE UP (ref 3.8–10.5)
WBC # FLD AUTO: 8.5 K/UL — SIGNIFICANT CHANGE UP (ref 3.8–10.5)

## 2017-05-20 PROCEDURE — 99233 SBSQ HOSP IP/OBS HIGH 50: CPT

## 2017-05-20 RX ORDER — TRAMADOL HYDROCHLORIDE 50 MG/1
25 TABLET ORAL DAILY
Qty: 0 | Refills: 0 | Status: DISCONTINUED | OUTPATIENT
Start: 2017-05-20 | End: 2017-05-22

## 2017-05-20 RX ORDER — APIXABAN 2.5 MG/1
2.5 TABLET, FILM COATED ORAL
Qty: 0 | Refills: 0 | Status: DISCONTINUED | OUTPATIENT
Start: 2017-05-20 | End: 2017-05-22

## 2017-05-20 RX ORDER — APIXABAN 2.5 MG/1
2.5 TABLET, FILM COATED ORAL
Qty: 0 | Refills: 0 | Status: DISCONTINUED | OUTPATIENT
Start: 2017-05-20 | End: 2017-05-20

## 2017-05-20 RX ORDER — GLYCERIN ADULT
1 SUPPOSITORY, RECTAL RECTAL ONCE
Qty: 0 | Refills: 0 | Status: COMPLETED | OUTPATIENT
Start: 2017-05-20 | End: 2017-05-20

## 2017-05-20 RX ADMIN — TRAMADOL HYDROCHLORIDE 25 MILLIGRAM(S): 50 TABLET ORAL at 18:28

## 2017-05-20 RX ADMIN — Medication 12.5 MILLIGRAM(S): at 14:15

## 2017-05-20 RX ADMIN — Medication 12.5 MILLIGRAM(S): at 21:32

## 2017-05-20 RX ADMIN — APIXABAN 2.5 MILLIGRAM(S): 2.5 TABLET, FILM COATED ORAL at 14:15

## 2017-05-20 RX ADMIN — Medication 100 MILLIGRAM(S): at 18:28

## 2017-05-20 RX ADMIN — Medication 50 MICROGRAM(S): at 05:26

## 2017-05-20 RX ADMIN — Medication 1 SUPPOSITORY(S): at 14:15

## 2017-05-20 RX ADMIN — TRAMADOL HYDROCHLORIDE 25 MILLIGRAM(S): 50 TABLET ORAL at 18:30

## 2017-05-20 RX ADMIN — Medication 12.5 MILLIGRAM(S): at 05:26

## 2017-05-20 RX ADMIN — Medication 0.3 MILLIGRAM(S): at 21:33

## 2017-05-20 NOTE — PROGRESS NOTE ADULT - ASSESSMENT
84 y/o F with h/o pAFIB, hypothyroidism, vasovagal syncope, macular degeneration, MV repair in 2004, p/w syncope with prodromes consistent with vasovagal nature and found to have T12 acute fracture without spinal cord compression.     1. Syncope:  Vasovagal. With positive orthostatic hypotension noted in vitals check in ED. She has had extensive heart monitor in the past by Dr. Menchaca and no arrhythmia noted even when she was complaining of palpitations, SOB and dizziness when wearing the monitor.  Encourage PO hydration and avoid long standing or prolonged sitting. She is instructed to sit down as soon as she feels the prodrome. Also, encourage her to use leg stocking.     2. paroxysmal AFIB: continues to be in NSR.  Eliquis was held on 5/18 due to low Platelet count on admission.  She doesn't want beta-blocker or amiodarone in the past.     3. Thrombocytopenia: Platelet count on blue top showed Plt 90K.  D/W Dr. Kyle -- ok to restart Eliquis.     4. T12 acute fracture without spinal cord compression. No neurosurgical intervention needed at this time. TLSO ordered, still awaiting for the fitting. Not sure who is supplying this.     5. Constipation: Glycerin supp. Senna / docusate.     6. Prophylaxis measure: On eliquis. Add Protonix while in hospital. 86 y/o F with h/o pAFIB, hypothyroidism, vasovagal syncope, macular degeneration, MV repair in 2004, p/w syncope with prodromes consistent with vasovagal nature and found to have T12 acute fracture without spinal cord compression.     1. Syncope:  Vasovagal. With positive orthostatic hypotension noted in vitals check in ED. She has had extensive heart monitor in the past by Dr. Menchaca and no arrhythmia noted even when she was complaining of palpitations, SOB and dizziness when wearing the monitor.  Encourage PO hydration and avoid long standing or prolonged sitting. She is instructed to sit down as soon as she feels the prodrome. Also, encourage her to use leg stocking.     2. paroxysmal AFIB: continues to be in NSR.  Eliquis was held on 5/18 due to low Platelet count on admission.  She doesn't want beta-blocker or amiodarone in the past.     3. Thrombocytopenia: Platelet count on blue top showed Plt 90K.  D/W Dr. Kyle -- ok to restart Eliquis.     4. T12 acute fracture without spinal cord compression. No neurosurgical intervention needed at this time. TLSO ordered, still awaiting for the fitting. Not sure who is supplying this. Will ask ortho.     5. Pain management: Percocet PRN. Adding Tramadol 25 mg daily.     6. Constipation: Glycerin supp. Senna / docusate.     7. Prophylaxis measure: On eliquis. Add Protonix while in hospital. 84 y/o F with h/o pAFIB, hypothyroidism, vasovagal syncope, macular degeneration, MV repair in 2004, p/w syncope with prodromes consistent with vasovagal nature and found to have T12 acute fracture without spinal cord compression.     1. Syncope:  Vasovagal. With positive orthostatic hypotension noted in vitals check in ED. She has had extensive heart monitor in the past by Dr. Menchaca and no arrhythmia noted even when she was complaining of palpitations, SOB and dizziness when wearing the monitor.  Encourage PO hydration and avoid long standing or prolonged sitting. She is instructed to sit down as soon as she feels the prodrome. Also, encourage her to use leg stocking.     2. paroxysmal AFIB: continues to be in NSR.  Eliquis was held on 5/18 due to low Platelet count on admission.  She doesn't want beta-blocker or amiodarone in the past.     3. Thrombocytopenia: Platelet count on blue top showed Plt 90K.  D/W Dr. Kyle -- ok to restart Eliquis.     4. T12 acute fracture without spinal cord compression. No neurosurgical intervention needed at this time. TLSO brace order sent to vendor by neurosurgical team. Please f/u on Monday again if not here.     5. Pain management: Percocet PRN. Adding Tramadol 25 mg daily.     6. Constipation: Glycerin supp. Senna / docusate.     7. Prophylaxis measure: On eliquis. Add Protonix while in hospital.

## 2017-05-21 DIAGNOSIS — F41.1 GENERALIZED ANXIETY DISORDER: ICD-10-CM

## 2017-05-21 DIAGNOSIS — R45.851 SUICIDAL IDEATIONS: ICD-10-CM

## 2017-05-21 DIAGNOSIS — F60.3 BORDERLINE PERSONALITY DISORDER: ICD-10-CM

## 2017-05-21 LAB
ANION GAP SERPL CALC-SCNC: 9 MMOL/L — SIGNIFICANT CHANGE UP (ref 5–17)
BUN SERPL-MCNC: 10 MG/DL — SIGNIFICANT CHANGE UP (ref 7–23)
CALCIUM SERPL-MCNC: 8.4 MG/DL — SIGNIFICANT CHANGE UP (ref 8.4–10.5)
CHLORIDE SERPL-SCNC: 98 MMOL/L — SIGNIFICANT CHANGE UP (ref 96–108)
CO2 SERPL-SCNC: 26 MMOL/L — SIGNIFICANT CHANGE UP (ref 22–31)
CREAT SERPL-MCNC: 0.7 MG/DL — SIGNIFICANT CHANGE UP (ref 0.5–1.3)
GLUCOSE SERPL-MCNC: 91 MG/DL — SIGNIFICANT CHANGE UP (ref 70–99)
HCT VFR BLD CALC: 40.5 % — SIGNIFICANT CHANGE UP (ref 34.5–45)
HGB BLD-MCNC: 13.5 G/DL — SIGNIFICANT CHANGE UP (ref 11.5–15.5)
MAGNESIUM SERPL-MCNC: 2 MG/DL — SIGNIFICANT CHANGE UP (ref 1.6–2.6)
MCHC RBC-ENTMCNC: 29.5 PG — SIGNIFICANT CHANGE UP (ref 27–34)
MCHC RBC-ENTMCNC: 33.3 G/DL — SIGNIFICANT CHANGE UP (ref 32–36)
MCV RBC AUTO: 88.4 FL — SIGNIFICANT CHANGE UP (ref 80–100)
PLATELET # BLD AUTO: 46 K/UL — LOW (ref 150–400)
POTASSIUM SERPL-MCNC: 3.8 MMOL/L — SIGNIFICANT CHANGE UP (ref 3.5–5.3)
POTASSIUM SERPL-SCNC: 3.8 MMOL/L — SIGNIFICANT CHANGE UP (ref 3.5–5.3)
RBC # BLD: 4.58 M/UL — SIGNIFICANT CHANGE UP (ref 3.8–5.2)
RBC # FLD: 13.7 % — SIGNIFICANT CHANGE UP (ref 10.3–16.9)
SODIUM SERPL-SCNC: 133 MMOL/L — LOW (ref 135–145)
WBC # BLD: 7.9 K/UL — SIGNIFICANT CHANGE UP (ref 3.8–10.5)
WBC # FLD AUTO: 7.9 K/UL — SIGNIFICANT CHANGE UP (ref 3.8–10.5)

## 2017-05-21 PROCEDURE — 99233 SBSQ HOSP IP/OBS HIGH 50: CPT

## 2017-05-21 PROCEDURE — 90791 PSYCH DIAGNOSTIC EVALUATION: CPT

## 2017-05-21 RX ORDER — ACETAMINOPHEN 500 MG
650 TABLET ORAL ONCE
Qty: 0 | Refills: 0 | Status: COMPLETED | OUTPATIENT
Start: 2017-05-21 | End: 2017-05-21

## 2017-05-21 RX ORDER — GLYCERIN ADULT
1 SUPPOSITORY, RECTAL RECTAL ONCE
Qty: 0 | Refills: 0 | Status: COMPLETED | OUTPATIENT
Start: 2017-05-21 | End: 2017-05-21

## 2017-05-21 RX ORDER — POTASSIUM CHLORIDE 20 MEQ
20 PACKET (EA) ORAL ONCE
Qty: 0 | Refills: 0 | Status: COMPLETED | OUTPATIENT
Start: 2017-05-21 | End: 2017-05-21

## 2017-05-21 RX ADMIN — Medication 100 MILLIGRAM(S): at 05:29

## 2017-05-21 RX ADMIN — Medication 12.5 MILLIGRAM(S): at 05:29

## 2017-05-21 RX ADMIN — SENNA PLUS 2 TABLET(S): 8.6 TABLET ORAL at 21:53

## 2017-05-21 RX ADMIN — Medication 650 MILLIGRAM(S): at 10:30

## 2017-05-21 RX ADMIN — TRAMADOL HYDROCHLORIDE 25 MILLIGRAM(S): 50 TABLET ORAL at 18:13

## 2017-05-21 RX ADMIN — Medication 650 MILLIGRAM(S): at 11:15

## 2017-05-21 RX ADMIN — Medication 50 MICROGRAM(S): at 05:29

## 2017-05-21 RX ADMIN — Medication 100 MILLIGRAM(S): at 18:13

## 2017-05-21 RX ADMIN — Medication 1 SUPPOSITORY(S): at 10:19

## 2017-05-21 RX ADMIN — APIXABAN 2.5 MILLIGRAM(S): 2.5 TABLET, FILM COATED ORAL at 05:29

## 2017-05-21 RX ADMIN — Medication 12.5 MILLIGRAM(S): at 14:37

## 2017-05-21 RX ADMIN — Medication 0.3 MILLIGRAM(S): at 21:53

## 2017-05-21 RX ADMIN — TRAMADOL HYDROCHLORIDE 25 MILLIGRAM(S): 50 TABLET ORAL at 19:00

## 2017-05-21 RX ADMIN — APIXABAN 2.5 MILLIGRAM(S): 2.5 TABLET, FILM COATED ORAL at 18:12

## 2017-05-21 RX ADMIN — Medication 12.5 MILLIGRAM(S): at 21:53

## 2017-05-21 NOTE — BEHAVIORAL HEALTH ASSESSMENT NOTE - NSBHVIOLPROTECT_PSY_A_CORE
Engagement in treatment/Residential stability/Relationship stability/Good treatment reponse/ compliance

## 2017-05-21 NOTE — BEHAVIORAL HEALTH ASSESSMENT NOTE - SUMMARY
86 year old , retired and domiciled female with a fairly stable life until more recently when Dx with Macular Degeneration and more recently fell fracturing her back and dealing with loss of her health and independence in the context of a history of independence and mistrustful traits.

## 2017-05-21 NOTE — PROGRESS NOTE ADULT - ASSESSMENT
86 y/o F with h/o pAFIB, hypothyroidism, vasovagal syncope, macular degeneration, MV repair in 2004, p/w syncope with prodromes consistent with vasovagal nature and found to have T12 acute fracture without spinal cord compression.     1. Syncope:      2. paroxysmal AFIB: continues to be in NSR.  Eliquis was held on 5/18 due to low Platelet count on admission.  She doesn't want beta-blocker or amiodarone in the past.     3. Thrombocytopenia: Platelet count on blue top showed Plt 90K.  D/W Dr. Kyle -- ok to restart Eliquis.     4. T12 acute fracture without spinal cord compression. No neurosurgical intervention needed at this time. TLSO brace order sent to vendor by neurosurgical team. Please f/u on Monday again if not here.     5. Pain management: Percocet PRN. Adding Tramadol 25 mg daily.     6. Constipation: Glycerin supp. Senna / docusate.     7. Prophylaxis measure: On eliquis. Add Protonix while in hospital. 86 y/o F with h/o pAFIB, hypothyroidism, vasovagal syncope, macular degeneration, MV repair in 2004, p/w syncope with prodromes consistent with vasovagal nature and found to have T12 acute fracture without spinal cord compression.

## 2017-05-21 NOTE — BEHAVIORAL HEALTH ASSESSMENT NOTE - NSBHCONSULTRECOMMENDOTHER_PSY_A_CORE FT
SW consult for home health aid/VNS  PT consult to recommend home vs physical rehab  continue weekly individual therapy addressing underlying anxiety and PD

## 2017-05-21 NOTE — PROGRESS NOTE ADULT - PROBLEM SELECTOR PLAN 5
Plt 92,000 this morning. Dr. Kyle discussed options with patient, currently no need for bone marrow biopsy. Pt to follow up with Dr. Kyle after discharge.
- patient endorsing SI this AM, appears tearful and states she "just wants it all to end"  - psychiatry consulted, patient placed on constant observation (misc nursing order) and staff made aware; will follow up recs

## 2017-05-21 NOTE — PROGRESS NOTE ADULT - PROBLEM SELECTOR PLAN 4
T12 fracture. Neurosurgery Dr. Kulkarni following. No acute neurosurgical intervention needed at this time. MRI thoracic spine non-contrast ordered. TLSO brace ordered. Pain control with Percocet.
- platelet count by blue top 90 yesterday 5/20, Dr. Kyle following  - patient refusing bone marrow biopsy at this time

## 2017-05-21 NOTE — BEHAVIORAL HEALTH ASSESSMENT NOTE - NSBHSUICPROTECTFACT_PSY_A_CORE
Ability to cope with stress/Responsibility to family and others/Positive therapeutic relationships/Identifies reasons for living/Fear of death or dying due to pain/suffering/Supportive social network or family/Future oriented

## 2017-05-21 NOTE — PROGRESS NOTE ADULT - PROBLEM SELECTOR PLAN 3
- T12 acute fracture without spinal cord compression, confirmed by MRI. No neurosurgical intervention needed at this time. TLSO brace in place.  - Percocet/tramadol ordered for pain - T12 acute fracture without spinal cord compression, confirmed by MRI. No neurosurgical intervention needed at this time. TLSO brace to be on when OOB or ambulating per neurosurgery.  - Percocet/tramadol ordered for pain; consider pain management consult

## 2017-05-21 NOTE — BEHAVIORAL HEALTH ASSESSMENT NOTE - HPI (INCLUDE ILLNESS QUALITY, SEVERITY, DURATION, TIMING, CONTEXT, MODIFYING FACTORS, ASSOCIATED SIGNS AND SYMPTOMS)
86 year old , retired and domiciled female with history of anxiety, macular degeneration, infertility and recent fall (related to mixing medications) sustaining Fx vertebrae concerned about her independence upon discharge.  She has not been fully evaluated either by SW or PT (per pt) and does not know the circumstances to which she will be discharged i.e. rehab or home health aid.  She denies thoughts, intent or plan to harm self and casually stated that she sometimes says that.  She is forward thinking however lacks insight into her history of traumas evidenced by living with an alcoholic, not having anyone to take care of her/infertility, self admitted vanity.  she sees a psychiatrist weekly for the past 2 years, not on any psychiatric medications and reports a good relationship with him.  she also reported a history of ambien abuse.

## 2017-05-21 NOTE — PROGRESS NOTE ADULT - PROBLEM SELECTOR PLAN 6
Continue Synthroid. TSH/T3/T4 WNL.
- Patient currently lives at home with her  who is s/p multiple back surgeries in a three floor walk-up and they are having difficulty performing ADLs. Patient states she would accept help at home if it was offered to her. PT initial eval from 5/19 states d/c instructions to be determined; follow up additional recs. SW team made aware.

## 2017-05-21 NOTE — PROGRESS NOTE ADULT - PROBLEM SELECTOR PLAN 1
- Dr. Estrella (neuro) consulted, follow up recs  - Positive orthostatic hypotension noted in vitals check in ED.  - Patient has had extensive heart monitor in the past by Dr. Menchaca and no arrhythmia noted even when she was complaining of palpitations, SOB and dizziness when wearing the monitor.  - Encourage PO hydration and avoid long standing or prolonged sitting. She is instructed to sit down as soon as she feels the prodrome. Encouraged use of compression stockings  - MRI brain negative for acute findings  - meclizine continued for dizziness as patient states it has helped in the past

## 2017-05-22 ENCOUNTER — TRANSCRIPTION ENCOUNTER (OUTPATIENT)
Age: 82
End: 2017-05-22

## 2017-05-22 VITALS — DIASTOLIC BLOOD PRESSURE: 58 MMHG | SYSTOLIC BLOOD PRESSURE: 133 MMHG | HEART RATE: 90 BPM | RESPIRATION RATE: 14 BRPM

## 2017-05-22 LAB
ANION GAP SERPL CALC-SCNC: 9 MMOL/L — SIGNIFICANT CHANGE UP (ref 5–17)
BUN SERPL-MCNC: 13 MG/DL — SIGNIFICANT CHANGE UP (ref 7–23)
CALCIUM SERPL-MCNC: 8.4 MG/DL — SIGNIFICANT CHANGE UP (ref 8.4–10.5)
CHLORIDE SERPL-SCNC: 104 MMOL/L — SIGNIFICANT CHANGE UP (ref 96–108)
CO2 SERPL-SCNC: 28 MMOL/L — SIGNIFICANT CHANGE UP (ref 22–31)
CREAT SERPL-MCNC: 0.8 MG/DL — SIGNIFICANT CHANGE UP (ref 0.5–1.3)
GLUCOSE SERPL-MCNC: 92 MG/DL — SIGNIFICANT CHANGE UP (ref 70–99)
HCT VFR BLD CALC: 39 % — SIGNIFICANT CHANGE UP (ref 34.5–45)
HGB BLD-MCNC: 13 G/DL — SIGNIFICANT CHANGE UP (ref 11.5–15.5)
MAGNESIUM SERPL-MCNC: 2 MG/DL — SIGNIFICANT CHANGE UP (ref 1.6–2.6)
MCHC RBC-ENTMCNC: 29.3 PG — SIGNIFICANT CHANGE UP (ref 27–34)
MCHC RBC-ENTMCNC: 33.3 G/DL — SIGNIFICANT CHANGE UP (ref 32–36)
MCV RBC AUTO: 87.8 FL — SIGNIFICANT CHANGE UP (ref 80–100)
PLATELET # BLD AUTO: 43 K/UL — LOW (ref 150–400)
POTASSIUM SERPL-MCNC: 3.9 MMOL/L — SIGNIFICANT CHANGE UP (ref 3.5–5.3)
POTASSIUM SERPL-SCNC: 3.9 MMOL/L — SIGNIFICANT CHANGE UP (ref 3.5–5.3)
RBC # BLD: 4.44 M/UL — SIGNIFICANT CHANGE UP (ref 3.8–5.2)
RBC # FLD: 13.2 % — SIGNIFICANT CHANGE UP (ref 10.3–16.9)
SODIUM SERPL-SCNC: 141 MMOL/L — SIGNIFICANT CHANGE UP (ref 135–145)
WBC # BLD: 6.2 K/UL — SIGNIFICANT CHANGE UP (ref 3.8–10.5)
WBC # FLD AUTO: 6.2 K/UL — SIGNIFICANT CHANGE UP (ref 3.8–10.5)

## 2017-05-22 PROCEDURE — 82550 ASSAY OF CK (CPK): CPT

## 2017-05-22 PROCEDURE — 72100 X-RAY EXAM L-S SPINE 2/3 VWS: CPT

## 2017-05-22 PROCEDURE — 85025 COMPLETE CBC W/AUTO DIFF WBC: CPT

## 2017-05-22 PROCEDURE — 72070 X-RAY EXAM THORAC SPINE 2VWS: CPT

## 2017-05-22 PROCEDURE — 85027 COMPLETE CBC AUTOMATED: CPT

## 2017-05-22 PROCEDURE — 93005 ELECTROCARDIOGRAM TRACING: CPT

## 2017-05-22 PROCEDURE — 99233 SBSQ HOSP IP/OBS HIGH 50: CPT

## 2017-05-22 PROCEDURE — 74176 CT ABD & PELVIS W/O CONTRAST: CPT

## 2017-05-22 PROCEDURE — 84436 ASSAY OF TOTAL THYROXINE: CPT

## 2017-05-22 PROCEDURE — 85730 THROMBOPLASTIN TIME PARTIAL: CPT

## 2017-05-22 PROCEDURE — 82553 CREATINE MB FRACTION: CPT

## 2017-05-22 PROCEDURE — 97116 GAIT TRAINING THERAPY: CPT

## 2017-05-22 PROCEDURE — 97161 PT EVAL LOW COMPLEX 20 MIN: CPT

## 2017-05-22 PROCEDURE — 85610 PROTHROMBIN TIME: CPT

## 2017-05-22 PROCEDURE — 83735 ASSAY OF MAGNESIUM: CPT

## 2017-05-22 PROCEDURE — 84484 ASSAY OF TROPONIN QUANT: CPT

## 2017-05-22 PROCEDURE — 84443 ASSAY THYROID STIM HORMONE: CPT

## 2017-05-22 PROCEDURE — 80048 BASIC METABOLIC PNL TOTAL CA: CPT

## 2017-05-22 PROCEDURE — 81003 URINALYSIS AUTO W/O SCOPE: CPT

## 2017-05-22 PROCEDURE — 70551 MRI BRAIN STEM W/O DYE: CPT

## 2017-05-22 PROCEDURE — 72146 MRI CHEST SPINE W/O DYE: CPT

## 2017-05-22 PROCEDURE — 99285 EMERGENCY DEPT VISIT HI MDM: CPT | Mod: 25

## 2017-05-22 PROCEDURE — 84480 ASSAY TRIIODOTHYRONINE (T3): CPT

## 2017-05-22 PROCEDURE — 36415 COLL VENOUS BLD VENIPUNCTURE: CPT

## 2017-05-22 PROCEDURE — 99222 1ST HOSP IP/OBS MODERATE 55: CPT

## 2017-05-22 PROCEDURE — 80053 COMPREHEN METABOLIC PANEL: CPT

## 2017-05-22 RX ORDER — FLUDROCORTISONE ACETATE 0.1 MG/1
1 TABLET ORAL
Qty: 0 | Refills: 0 | COMMUNITY
Start: 2017-05-22

## 2017-05-22 RX ORDER — ACETAMINOPHEN 500 MG
650 TABLET ORAL EVERY 8 HOURS
Qty: 0 | Refills: 0 | Status: DISCONTINUED | OUTPATIENT
Start: 2017-05-22 | End: 2017-05-22

## 2017-05-22 RX ORDER — ACETAMINOPHEN WITH CODEINE 300MG-30MG
1 TABLET ORAL EVERY 8 HOURS
Qty: 0 | Refills: 0 | Status: DISCONTINUED | OUTPATIENT
Start: 2017-05-22 | End: 2017-05-22

## 2017-05-22 RX ORDER — ACETAMINOPHEN 500 MG
2 TABLET ORAL
Qty: 0 | Refills: 0 | COMMUNITY
Start: 2017-05-22

## 2017-05-22 RX ORDER — DOCUSATE SODIUM 100 MG
1 CAPSULE ORAL
Qty: 0 | Refills: 0 | COMMUNITY
Start: 2017-05-22

## 2017-05-22 RX ORDER — ACETAMINOPHEN WITH CODEINE 300MG-30MG
1 TABLET ORAL
Qty: 0 | Refills: 0 | COMMUNITY
Start: 2017-05-22

## 2017-05-22 RX ORDER — FLUDROCORTISONE ACETATE 0.1 MG/1
0.1 TABLET ORAL DAILY
Qty: 0 | Refills: 0 | Status: DISCONTINUED | OUTPATIENT
Start: 2017-05-22 | End: 2017-05-22

## 2017-05-22 RX ORDER — POTASSIUM CHLORIDE 20 MEQ
20 PACKET (EA) ORAL ONCE
Qty: 0 | Refills: 0 | Status: COMPLETED | OUTPATIENT
Start: 2017-05-22 | End: 2017-05-22

## 2017-05-22 RX ORDER — POTASSIUM CHLORIDE 20 MEQ
20 PACKET (EA) ORAL ONCE
Qty: 0 | Refills: 0 | Status: DISCONTINUED | OUTPATIENT
Start: 2017-05-22 | End: 2017-05-22

## 2017-05-22 RX ORDER — SENNA PLUS 8.6 MG/1
2 TABLET ORAL
Qty: 0 | Refills: 0 | COMMUNITY
Start: 2017-05-22

## 2017-05-22 RX ADMIN — Medication 12.5 MILLIGRAM(S): at 07:22

## 2017-05-22 RX ADMIN — Medication 1 TABLET(S): at 15:29

## 2017-05-22 RX ADMIN — APIXABAN 2.5 MILLIGRAM(S): 2.5 TABLET, FILM COATED ORAL at 07:22

## 2017-05-22 RX ADMIN — Medication 12.5 MILLIGRAM(S): at 13:52

## 2017-05-22 RX ADMIN — FLUDROCORTISONE ACETATE 0.1 MILLIGRAM(S): 0.1 TABLET ORAL at 18:27

## 2017-05-22 RX ADMIN — APIXABAN 2.5 MILLIGRAM(S): 2.5 TABLET, FILM COATED ORAL at 17:40

## 2017-05-22 RX ADMIN — Medication 1 TABLET(S): at 16:43

## 2017-05-22 RX ADMIN — Medication 20 MILLIEQUIVALENT(S): at 11:10

## 2017-05-22 RX ADMIN — Medication 50 MICROGRAM(S): at 06:32

## 2017-05-22 RX ADMIN — Medication 100 MILLIGRAM(S): at 17:40

## 2017-05-22 RX ADMIN — Medication 100 MILLIGRAM(S): at 07:22

## 2017-05-22 NOTE — CONSULT NOTE ADULT - SUBJECTIVE AND OBJECTIVE BOX
CLAUDIA HERNANDEZ      Patient is a 86y old  Female who presents with a chief complaint of syncope (22 May 2017 13:52)      HPI:  Patient is an 84yo F, occasional marijuana user, with PMHx afib (on Eliquis, s/p failed DCCV with amiodarone and subsequent conversion to NSR), hypothyroidism, hypotension, vasovagal syncope, macular degeneration s/p b/l eye injections, mitral valve repair in 2004, chronic diastolic heart failure (EF 55%, mild-moderate AR/MR) with recent admission at [facility] 4/30-5/2 for syncope workup who now presents to [facility] ED 5/18/17 complaining of syncopal episode. On the prior admission, patient troponin trend 0.115->0.121 -> 0.129. Hematology, Dr. Kyle consulted for low platelet count and patient confirmed to have thrombocytopenia with platelet count of 60. Patient was recommended for outpatient bone marrow biopsy with Dr. Kyle. Dr. Menchaca, EP, consulted who recommended loop recorder placement, BB and amio. However, patient refusing all. Patient underwent echocardiogram revealing EF 60-65%, LA mildly dilated, interatrial septal aneurysm, mild AI, mitral ring in place with mild-mod MR. Carotid U/S also performed revealing no significant stenosis. Physical Therapy evaluated pt and no home needs but may f/u with her Cardiologist regarding possible Vestibular rehab. Patient now reports she was standing in her kitchen at the counter, felt "flushed" and lost consciousness. Patient believes she slid down between two cabinets and fell on her but/lower back. She awoke on the floor in the sitting position. Patient also reports b/l ocular migraines with + photophobia that come and go on their own for the last week. Of note, patient recently stopped her meclizine yesterday and reports that it did improve her dizziness somewhat. Patient saw a neurologist Dr. Kessler, who recommended she follow up with an MRI brain. She also saw and ENT doctor who recommended a balance test which she was unable to get an appointment for until June. Patient denies fatigue, SOB, PND, orthopnea, N/V, hematochezia, melena. On arrival to the ED, VS  Temp 96.5F, HR 90bpm, /72, RR 16, O2 sat 99% RA, orthostatics positive from sitting to standing. Labs significant for WBC 10.9 with left shift, platelets 15K with stable Hgb/Hct, trops neg x 1. UA negative. CT abd/pelvis revealed acute compression fracture of the T12 vertebral body with retropulsion of the superior posterior endplate into the spinal canal, no evidence of retroperitoneal hematoma. Patient is now admitted to [facility] for monitoring on telemetry and further workup. (18 May 2017 17:51)      Addl  Medical issues:       HEALTH ISSUES - PROBLEM Dx:  Generalized anxiety disorder  Borderline personality disorder  Suicidal ideation: Suicidal ideation  Compression fracture of thoracic vertebra: Compression fracture of thoracic vertebra  Discharge planning issues: Discharge planning issues  Edema of lower extremity: Edema of lower extremity  Hypothyroidism: Hypothyroidism  Chronic diastolic congestive heart failure: Chronic diastolic congestive heart failure  A-fib: A-fib  Thrombocytopenia: Thrombocytopenia  Compression fracture of twelfth thoracic vertebra, initial encounter: Compression fracture of twelfth thoracic vertebra, initial encounter  Syncope: Syncope            MEDICATIONS  (STANDING):  meclizine 12.5milliGRAM(s) Oral three times a day  levothyroxine 50MICROGram(s) Oral daily  estrogens    conjugated 0.3milliGRAM(s) Oral daily  senna 2Tablet(s) Oral at bedtime  docusate sodium 100milliGRAM(s) Oral two times a day  apixaban 2.5milliGRAM(s) Oral two times a day  fludroCORTISONE 0.1milliGRAM(s) Oral daily    MEDICATIONS  (PRN):  acetaminophen 300 mG/codeine 30 mG 1Tablet(s) Oral every 8 hours PRN Severe Pain (7 - 10)  acetaminophen   Tablet. 650milliGRAM(s) Oral every 8 hours PRN Moderate Pain (4 - 6)          PAST MEDICAL & SURGICAL HISTORY:  Vasovagal syncope  Migraines  Hypotension  Hypothyroidism  A-fib  H/O mitral valve repair      REVIEW OF SYSTEMS:  [x] As per HPI          Reviewed   no change                            Changes noted  CONSTITUTIONAL: No fever, weight loss, or fatigue  RESPIRATORY: No cough, wheezing, chills or hemoptysis; No Shortness of Breath  CARDIOVASCULAR: No chest pain, palpitations, dizziness, or leg swelling  GASTROINTESTINAL: No abdominal or epigastric pain. No nausea, vomiting, or hematemesis; No diarrhea or constipation. No melena or hematochezia.  MUSCULOSKELETAL: No joint pain or swelling; No muscle, back, or extremity pain  Neuro:   Grossly  Negative  Psych        Awake  alert  [x] All others negative	  [ ] Unable to obtain      Vital Signs Last 24 Hrs  T(C): 35.6, Max: 36.8 (05-21 @ 21:44)  T(F): 96, Max: 98.2 (05-21 @ 21:44)  HR: 84 (71 - 86)  BP: 128/60 (97/45 - 142/67)  BP(mean): --  RR: 16 (16 - 16)  SpO2: 98% (98% - 98%)    PHYSICAL EXAM:      Constitutional:    Eyes:    ENMT:    Neck:    Breasts:    Back:    Respiratory:    Cardiovascular:    Gastrointestinal:    Genitourinary:    Rectal:    Extremities:    Vascular:    Neurological:    Skin:    Lymph Nodes:    Musculoskeletal:    Psychiatric:                              13.0   6.2   )-----------( 43       ( 22 May 2017 07:31 )             39.0     05-22    141  |  104  |  13  ----------------------------<  92  3.9   |  28  |  0.80    Ca    8.4      22 May 2017 07:31  Mg     2.0     05-22            CAPILLARY BLOOD GLUCOSE      I&O's Summary    I & Os for current day (as of 22 May 2017 18:53)  =============================================  IN: 600 ml / OUT: 0 ml / NET: 600 ml          ASSESSMENT/PLAN/RECOMMENDATIONS CLAUDIA HERNANDEZ      Patient is a 86y old  Female who presents with a chief complaint of syncope (22 May 2017 13:52)      HPI:  Patient is an 84yo F, occasional marijuana user, with PMHx afib (on Eliquis, s/p failed DCCV with amiodarone and subsequent conversion to NSR), hypothyroidism, hypotension, vasovagal syncope, macular degeneration s/p b/l eye injections, mitral valve repair in 2004, chronic diastolic heart failure (EF 55%, mild-moderate AR/MR) with recent admission at [facility] 4/30-5/2 for syncope workup who now presents to [facility] ED 5/18/17 complaining of syncopal episode. On the prior admission, patient troponin trend 0.115->0.121 -> 0.129. Hematology, Dr. Kyle consulted for low platelet count and patient confirmed to have thrombocytopenia with platelet count of 60. Patient was recommended for outpatient bone marrow biopsy with Dr. Kyle. Dr. Menchaca, EP, consulted who recommended loop recorder placement, BB and amio. However, patient refusing all. Patient underwent echocardiogram revealing EF 60-65%, LA mildly dilated, interatrial septal aneurysm, mild AI, mitral ring in place with mild-mod MR. Carotid U/S also performed revealing no significant stenosis. Physical Therapy evaluated pt and no home needs but may f/u with her Cardiologist regarding possible Vestibular rehab. Patient now reports she was standing in her kitchen at the counter, felt "flushed" and lost consciousness. Patient believes she slid down between two cabinets and fell on her but/lower back. She awoke on the floor in the sitting position. Patient also reports b/l ocular migraines with + photophobia that come and go on their own for the last week. Of note, patient recently stopped her meclizine yesterday and reports that it did improve her dizziness somewhat. Patient saw a neurologist Dr. Kessler, who recommended she follow up with an MRI brain. She also saw and ENT doctor who recommended a balance test which she was unable to get an appointment for until June. Patient denies fatigue, SOB, PND, orthopnea, N/V, hematochezia, melena. On arrival to the ED, VS  Temp 96.5F, HR 90bpm, /72, RR 16, O2 sat 99% RA, orthostatics positive from sitting to standing. Labs significant for WBC 10.9 with left shift, platelets 15K with stable Hgb/Hct, trops neg x 1. UA negative. CT abd/pelvis revealed acute compression fracture of the T12 vertebral body with retropulsion of the superior posterior endplate into the spinal canal, no evidence of retroperitoneal hematoma. Patient is now admitted to 5 [facility] for monitoring on telemetry and further workup. (18 May 2017 17:51)      Addl  Medical issues:       HEALTH ISSUES - PROBLEM Dx:  Generalized anxiety disorder  Borderline personality disorder  Suicidal ideation: Suicidal ideation  Compression fracture of thoracic vertebra: Compression fracture of thoracic vertebra  Discharge planning issues: Discharge planning issues  Edema of lower extremity: Edema of lower extremity  Hypothyroidism: Hypothyroidism  Chronic diastolic congestive heart failure: Chronic diastolic congestive heart failure  A-fib: A-fib  Thrombocytopenia: Thrombocytopenia  Compression fracture of twelfth thoracic vertebra, initial encounter: Compression fracture of twelfth thoracic vertebra, initial encounter  Syncope: Syncope            MEDICATIONS  (STANDING):  meclizine 12.5milliGRAM(s) Oral three times a day  levothyroxine 50MICROGram(s) Oral daily  estrogens    conjugated 0.3milliGRAM(s) Oral daily  senna 2Tablet(s) Oral at bedtime  docusate sodium 100milliGRAM(s) Oral two times a day  apixaban 2.5milliGRAM(s) Oral two times a day  fludroCORTISONE 0.1milliGRAM(s) Oral daily    MEDICATIONS  (PRN):  acetaminophen 300 mG/codeine 30 mG 1Tablet(s) Oral every 8 hours PRN Severe Pain (7 - 10)  acetaminophen   Tablet. 650milliGRAM(s) Oral every 8 hours PRN Moderate Pain (4 - 6)          PAST MEDICAL & SURGICAL HISTORY:  Vasovagal syncope  Migraines  Hypotension  Hypothyroidism  A-fib  H/O mitral valve repair      REVIEW OF SYSTEMS:  [x] As per HPI          Reviewed   no change                            Changes noted  CONSTITUTIONAL: No fever, weight loss, or fatigue   vasovagal syncope  RESPIRATORY: No cough, wheezing, chills or hemoptysis; No Shortness of Breath  CARDIOVASCULAR: No chest pain, palpitations, dizziness, or leg swelling  GASTROINTESTINAL: No abdominal or epigastric pain. No nausea, vomiting, or hematemesis; No diarrhea or constipation. No melena or hematochezia.  MUSCULOSKELETAL: No joint pain or swelling; No muscle, back, or extremity pain  Neuro:   Grossly  Negative  Psych        Awake  alert  [x] All others negative	  [ ] Unable to obtain      Vital Signs Last 24 Hrs  T(C): 35.6, Max: 36.8 (05-21 @ 21:44)  T(F): 96, Max: 98.2 (05-21 @ 21:44)  HR: 84 (71 - 86)  BP: 128/60 (97/45 - 142/67)  BP(mean): --  RR: 16 (16 - 16)  SpO2: 98% (98% - 98%)    PHYSICAL EXAM:      Constitutional:awake, alert    Eyes:    ENMT:neg    Neck:neg    Breasts:defer    Back:    Respiratory:clear to A    Cardiovascular:ireg s1 s2    Gastrointestinal:soft BS present    Genitourinary:    Rectal:    Extremities:FROM    Vascular:    Neurological:intact    Skin:    Lymph Nodes:    Musculoskeletal:    Psychiatric:awake, alert                              13.0   6.2   )-----------( 43       ( 22 May 2017 07:31 )             39.0     05-22    141  |  104  |  13  ----------------------------<  92  3.9   |  28  |  0.80    Ca    8.4      22 May 2017 07:31  Mg     2.0     05-22            CAPILLARY BLOOD GLUCOSE      I&O's Summary    I & Os for current day (as of 22 May 2017 18:53)  =============================================  IN: 600 ml / OUT: 0 ml / NET: 600 ml          ASSESSMENT/PLAN/RECOMMENDATIONS

## 2017-05-22 NOTE — DISCHARGE NOTE ADULT - MEDICATION SUMMARY - MEDICATIONS TO TAKE
I will START or STAY ON the medications listed below when I get home from the hospital:    fludrocortisone 0.1 mg oral tablet  -- 1 tab(s) by mouth once a day  -- Indication: For maintains blood pressure    acetaminophen 325 mg oral tablet  -- 2 tab(s) by mouth every 8 hours, As needed, Moderate Pain (4 - 6)  -- Indication: For pain    acetaminophen-codeine 300 mg-30 mg oral tablet  -- 1 tab(s) by mouth every 8 hours, As needed, Severe Pain (7 - 10)  -- Indication: For moderate-severe pain    Eliquis 2.5 mg oral tablet  -- 1 tab(s) by mouth 2 times a day  -- Indication: For Afib, prevents stroke    meclizine 12.5 mg oral tablet  -- 1 tab(s) by mouth 3 times a day  -- Indication: For Dizziness    docusate sodium 100 mg oral capsule  -- 1 cap(s) by mouth 2 times a day  -- Indication: For As needed for constipation    senna oral tablet  -- 2 tab(s) by mouth once a day (at bedtime)  -- Indication: For As needed for constipation    Premarin 0.3 mg oral tablet  -- 1 tab(s) by mouth once a day  -- Indication: For As prescribed    levothyroxine 50 mcg (0.05 mg) oral capsule  -- 1 cap(s) by mouth once a day  -- Indication: For Hypothyroidism

## 2017-05-22 NOTE — DISCHARGE NOTE ADULT - HOSPITAL COURSE
Patient is an 86yo F, occasional marijuana user, with PMHx afib (on Eliquis, s/p failed DCCV with amiodarone and subsequent conversion to NSR), hypothyroidism, hypotension, vasovagal syncope, macular degeneration s/p b/l eye injections, mitral valve repair in 2004, chronic diastolic heart failure (EF 55%, mild-moderate AR/MR) with recent admission at [facility] 4/30-5/2 for syncope workup who now presents to [facility] ED 5/18/17 complaining of syncopal episode. On the prior admission, patient troponin trend 0.115->0.121 -> 0.129. Hematology, Dr. Kyle consulted for low platelet count and patient confirmed to have thrombocytopenia with platelet count of 60. Patient was recommended for outpatient bone marrow biopsy with Dr. Kyle. Patient underwent echocardiogram revealing EF 60-65%, LA mildly dilated, interatrial septal aneurysm, mild AI, mitral ring in place with mild-mod MR. Carotid U/S also performed revealing no significant stenosis. Physical Therapy evaluated pt and no home needs but may f/u with her Cardiologist regarding possible Vestibular rehab. Patient now reports she was standing in her kitchen at the counter, felt "flushed" and lost consciousness. Patient believes she slid down between two cabinets and fell on her but/lower back. She awoke on the floor in the sitting position. Patient also reports b/l ocular migraines with + photophobia that come and go on their own for the last week. Of note, patient recently stopped her meclizine yesterday and reports that it did improve her dizziness somewhat. Patient saw a neurologist Dr. Kessler, who recommended she follow up with an MRI brain. She also saw and ENT doctor who recommended a balance test which she was unable to get an appointment for until June. Patient denies fatigue, SOB, PND, orthopnea, N/V, hematochezia, melena. On arrival to the ED, VS  Temp 96.5F, HR 90bpm, /72, RR 16, O2 sat 99% RA, orthostatics positive from sitting to standing. Labs significant for WBC 10.9 with left shift, platelets 15K with stable Hgb/Hct, trops neg x 1. UA negative. CT abd/pelvis revealed acute compression fracture of the T12 vertebral body with retropulsion of the superior posterior endplate into the spinal canal, no evidence of retroperitoneal hematoma. Patient is now admitted to [facility] for monitoring on telemetry and further workup. Patient previously had extensive heart monitoring with Dr. Menchaca and no arrhythmia was noted when patient was symptomatic with SOB, palpitations and dizziness. Patient is encouraged to increase PO intake and fluid intake as well as avoid prolonged standing or sitting. Additionally, patient is started on florinef 0.1mg daily and should wear compression stockings. Patient was seen by Dr. Kyle for low platelet count and patient is determined to have thrombocytopenia but is stable to be on eliquis 2.5mg BID for afib stroke prevention. Patient was seen by neurosurgery for acute T12 compression fracture and it was determined there was no spinal cord compression. Patient was given TLSO brace to be on when the patient is out of bed or ambulating. Pain management was consulted and recommended tylenol #3 Q8hrs or regular tylenol Q8hrs as needed depending on pain severity. Additionally, patient endorsed suicidal ideation on 5/21 and was seen by psychiatry. Psychiatry determined patient was not in fact suicidal and did not require constant or enhanced observation. Patient has remained in a good mood currently not endorsing SI or HI. Patient has outpatient psychologist with whom she has a good relationship and will follow up with them weekly. Patient was seen by PT and is currently recommended for BERNARDA. Patient should follow up with her ENT doctor for recommended vestibular rehab and continue meclizine. Patient was seen and examined and is asymptomatic without complaints. Patient is stable for discharge per Dr. Sultana. Patient has been given appropriate discharge instructions including medication regimen and follow up.

## 2017-05-22 NOTE — CONSULT NOTE ADULT - SUBJECTIVE AND OBJECTIVE BOX
NEUROSURGERY PAIN MANAGEMENT CONSULT NOTE    ** INCOMPLETE NOTE **    Chief Complaint:    HPI:  Patient is an 84yo F, occasional marijuana user, with PMHx afib (on Eliquis, s/p failed DCCV with amiodarone and subsequent conversion to NSR), hypothyroidism, hypotension, vasovagal syncope, macular degeneration s/p b/l eye injections, mitral valve repair in 2004, chronic diastolic heart failure (EF 55%, mild-moderate AR/MR) with recent admission at [facility] 4/30-5/2 for syncope workup who now presents to [facility] ED 5/18/17 complaining of syncopal episode. On the prior admission, patient troponin trend 0.115->0.121 -> 0.129. Hematology, Dr. Kyle consulted for low platelet count and patient confirmed to have thrombocytopenia with platelet count of 60. Patient was recommended for outpatient bone marrow biopsy with Dr. Kyle. Dr. Menchaca, EP, consulted who recommended loop recorder placement, BB and amio. However, patient refusing all. Patient underwent echocardiogram revealing EF 60-65%, LA mildly dilated, interatrial septal aneurysm, mild AI, mitral ring in place with mild-mod MR. Carotid U/S also performed revealing no significant stenosis. Physical Therapy evaluated pt and no home needs but may f/u with her Cardiologist regarding possible Vestibular rehab. Patient now reports she was standing in her kitchen at the counter, felt "flushed" and lost consciousness. Patient believes she slid down between two cabinets and fell on her but/lower back. She awoke on the floor in the sitting position. Patient also reports b/l ocular migraines with + photophobia that come and go on their own for the last week. Of note, patient recently stopped her meclizine yesterday and reports that it did improve her dizziness somewhat. Patient saw a neurologist Dr. Kessler, who recommended she follow up with an MRI brain. She also saw and ENT doctor who recommended a balance test which she was unable to get an appointment for until June. Patient denies fatigue, SOB, PND, orthopnea, N/V, hematochezia, melena. On arrival to the ED, VS  Temp 96.5F, HR 90bpm, /72, RR 16, O2 sat 99% RA, orthostatics positive from sitting to standing. Labs significant for WBC 10.9 with left shift, platelets 15K with stable Hgb/Hct, trops neg x 1. UA negative. CT abd/pelvis revealed acute compression fracture of the T12 vertebral body with retropulsion of the superior posterior endplate into the spinal canal, no evidence of retroperitoneal hematoma. Patient is now admitted to [facility] for monitoring on telemetry and further workup. (18 May 2017 17:51)      PAST MEDICAL & SURGICAL HISTORY:  Vasovagal syncope  Migraines  Hypotension  Hypothyroidism  A-fib  H/O mitral valve repair      FAMILY HISTORY:      SOCIAL HISTORY:  [ ] Denies Smoking, Alcohol, or Drug Use    HOME MEDICATIONS:   Please refer to initial HNP    PAIN HOME MEDICATIONS:    Allergies    latex (Other)  Orange Juice (Other)  penicillin (Angioedema)    Intolerances        PAIN MEDICATIONS:  meclizine 12.5milliGRAM(s) Oral three times a day  oxyCODONE  5 mG/acetaminophen 325 mG 1Tablet(s) Oral every 4 hours PRN  traMADol 25milliGRAM(s) Oral daily    Heme:  apixaban 2.5milliGRAM(s) Oral two times a day    Antibiotics:    Cardiovascular:    GI:  senna 2Tablet(s) Oral at bedtime  docusate sodium 100milliGRAM(s) Oral two times a day    Endocrine:  levothyroxine 50MICROGram(s) Oral daily  estrogens    conjugated 0.3milliGRAM(s) Oral daily    All Other Medications:  potassium chloride    Tablet ER 20milliEquivalent(s) Oral once      REVIEW OF SYSTEMS:  CONSTITUTIONAL: No fever, weight loss, or fatigue  EYES: No eye pain, visual disturbances, or discharge  ENMT:  No difficulty hearing, tinnitus, vertigo; No sinus or throat pain  NECK: No pain or stiffness  BREASTS: No pain, masses, or nipple discharge  RESPIRATORY: No cough, wheezing, chills or hemoptysis; No shortness of breath  CARDIOVASCULAR: No chest pain, palpitations, dizziness, or leg swelling  GASTROINTESTINAL: No abdominal or epigastric pain. No nausea, vomiting, or hematemesis; No diarrhea or constipation. No melena or hematochezia.  GENITOURINARY: No dysuria, frequency, hematuria, or incontinence  NEUROLOGICAL: No headaches, memory loss, loss of strength, numbness, or tremors  SKIN: No itching, burning, rashes, or lesions   LYMPH NODES: No enlarged glands  ENDOCRINE: No heat or cold intolerance; No hair loss  MUSCULOSKELETAL: No joint pain or swelling; No muscle, back, or extremity pain  PSYCHIATRIC: No depression, anxiety, mood swings, or difficulty sleeping  HEME/LYMPH: No easy bruising, or bleeding gums  ALLERY AND IMMUNOLOGIC: No hives or eczema      Vital Signs Last 24 Hrs  T(C): 36, Max: 36.8 (05-21 @ 21:44)  T(F): 96.8, Max: 98.2 (05-21 @ 21:44)  HR: 77 (66 - 88)  BP: 97/45 (93/51 - 142/67)  BP(mean): --  RR: 16 (16 - 16)  SpO2: 98% (96% - 98%)    FUNCTIONAL ASSESSMENT:  PAIN SCORE AT REST:         SCALE USED: (1-10 VNRS)  Comment:  PAIN SCORE WITH ACTIVITY:         SCALE USED: (1-10 VNRS)  Comment:    PQRST:   Palliative:   Quality:     GENERAL: NAD, well-groomed, well-developed  HEAD:  Atraumatic, Normocephalic  EYES: EOMI, PERRLA, conjunctiva and sclera clear  NECK: Supple, ___ collar  NERVOUS SYSTEM:    Alert & Oriented X3, Good concentration;   Cranial nerves grossly intact  Motor Strength 5/5 B/L upper and lower extremities;   DTRs 2+ intact and symmetric  Sensation intact to LT in UE/LE in 3 dermatomes    Cervical: No facet tenderness. Negative Spurlings sign. Negative Solorio's sign. Negative Brudzinski's sign. Negative Kernig's sign. Cervical ROM not assessed, s/p surgery and restricted head turning.  ROM  Cervical flexion: 50  Right lateral flexion: 45  Left lateral flexion: 45  Extension: 60  Left rotation: 80  Right rotation: 80    Lumbar: No lumbar tenderness. Negative straight leg raise. Negative crossed straight leg raise. Negative Davidson's sign. Negative facet loading maneuvers. Lumbar ROM not assessed, s/p surgery and restricted head turning.  ROM  Trunk extension: 25  Trunk flexion: 90  Right rotation: 25  Left rotation: 25    CHEST/LUNG: Clear to auscultation bilaterally; No rales, rhonchi, wheezing, or rubs  HEART: Regular rate and rhythm; No murmurs, rubs, or gallops  ABDOMEN: Soft, Nontender, Nondistended; Bowel sounds present  EXTREMITIES:  2+ Peripheral Pulses, No clubbing, cyanosis, or edema  SKIN: No rashes or lesions    LABS:                        13.0   6.2   )-----------( 43       ( 22 May 2017 07:31 )             39.0     05-22    141  |  104  |  13  ----------------------------<  92  3.9   |  28  |  0.80    Ca    8.4      22 May 2017 07:31  Mg     2.0     05-22            RADIOLOGY:    Drug Screen:        ASSESSMENT:       PLAN:   1. Opioids    Since yesterday 6am, pt has required:     PCA Setting:   - Opioids:   - Initial Bolus:   - Initial Demand:   - Lockout:   - Continuous Rate:   - 4 hour limit:     PLAN: C/w . Transition to . LA not required. Pt receiving adequete coverage. First step. For rescue dosing, will order .      2. Neuropathics  Pt currently denies neuropathic pain. No numbness/tingling/electric shock like sensation in LE/UE b.l.    PLAN: No indication for neuropathic agents.     3. Adjuvants  Pt ___ complaining of muscle spasms/cramping in neck/back. Tylenol 650mg q6h PRN for Mild Pain. Pt on/not on Percocet.     PLAN: C/w     4. Prophylactic:   Bowel regimen: Docusate Senna;    Nausea PRN: Zofran PRN for Nausea, pt does not c/o current    5. Functional Goals:   Pt will get OOB with PT today. Pt will resume previous level of activity without impairment from surgery.     6. Additional Consults:   None recommended.     7. Additional Labs/Imaging:   None recommended.     8. Follow up, Discharge Planning:   Patient is set for discharge to:   Discharge is pending:   Pain Management follow up plan: NEUROSURGERY PAIN MANAGEMENT CONSULT NOTE    ** INCOMPLETE NOTE **    Chief Complaint: Low back pain    HPI:  Patient is an 86yo F, occasional marijuana user, with PMHx afib (on Eliquis, s/p failed DCCV with amiodarone and subsequent conversion to NSR), hypothyroidism, hypotension, vasovagal syncope, macular degeneration s/p b/l eye injections, mitral valve repair in 2004, chronic diastolic heart failure (EF 55%, mild-moderate AR/MR) with recent admission at [facility] 4/30-5/2 for syncope workup who now presents to [facility] ED 5/18/17 complaining of syncopal episode. On the prior admission, patient troponin trend 0.115->0.121 -> 0.129. Hematology, Dr. Kyle consulted for low platelet count and patient confirmed to have thrombocytopenia with platelet count of 60. Patient was recommended for outpatient bone marrow biopsy with Dr. Kyle. Dr. Menchaca, EP, consulted who recommended loop recorder placement, BB and amio. However, patient refusing all. Patient underwent echocardiogram revealing EF 60-65%, LA mildly dilated, interatrial septal aneurysm, mild AI, mitral ring in place with mild-mod MR. Carotid U/S also performed revealing no significant stenosis. Physical Therapy evaluated pt and no home needs but may f/u with her Cardiologist regarding possible Vestibular rehab. Patient now reports she was standing in her kitchen at the counter, felt "flushed" and lost consciousness. Patient believes she slid down between two cabinets and fell on her but/lower back. She awoke on the floor in the sitting position. Patient also reports b/l ocular migraines with + photophobia that come and go on their own for the last week. Of note, patient recently stopped her meclizine yesterday and reports that it did improve her dizziness somewhat. Patient saw a neurologist Dr. Kessler, who recommended she follow up with an MRI brain. She also saw and ENT doctor who recommended a balance test which she was unable to get an appointment for until June. Patient denies fatigue, SOB, PND, orthopnea, N/V, hematochezia, melena. On arrival to the ED, VS  Temp 96.5F, HR 90bpm, /72, RR 16, O2 sat 99% RA, orthostatics positive from sitting to standing. Labs significant for WBC 10.9 with left shift, platelets 15K with stable Hgb/Hct, trops neg x 1. UA negative. CT abd/pelvis revealed acute compression fracture of the T12 vertebral body with retropulsion of the superior posterior endplate into the spinal canal, no evidence of retroperitoneal hematoma. Patient is now admitted to [facility] for monitoring on telemetry and further workup. (18 May 2017 17:51)      PAST MEDICAL & SURGICAL HISTORY:  Vasovagal syncope  Migraines  Hypotension  Hypothyroidism  A-fib  H/O mitral valve repair      FAMILY HISTORY:  Non-contributory    SOCIAL HISTORY:  [ ] Denies Smoking, Alcohol, or Drug Use    HOME MEDICATIONS:   Please refer to initial HNP    PAIN HOME MEDICATIONS:      Allergies    latex (Other)  Orange Juice (Other)  penicillin (Angioedema)    Intolerances        PAIN MEDICATIONS:  meclizine 12.5milliGRAM(s) Oral three times a day  oxyCODONE  5 mG/acetaminophen 325 mG 1Tablet(s) Oral every 4 hours PRN  traMADol 25milliGRAM(s) Oral daily    Heme:  apixaban 2.5milliGRAM(s) Oral two times a day    Antibiotics:    Cardiovascular:    GI:  senna 2Tablet(s) Oral at bedtime  docusate sodium 100milliGRAM(s) Oral two times a day    Endocrine:  levothyroxine 50MICROGram(s) Oral daily  estrogens    conjugated 0.3milliGRAM(s) Oral daily    All Other Medications:  potassium chloride    Tablet ER 20milliEquivalent(s) Oral once      REVIEW OF SYSTEMS:  CONSTITUTIONAL: No fever, weight loss, or fatigue  EYES: No eye pain, visual disturbances, or discharge  ENMT:  No difficulty hearing, tinnitus, vertigo; No sinus or throat pain  NECK: No pain or stiffness  BREASTS: No pain, masses, or nipple discharge  RESPIRATORY: No cough, wheezing, chills or hemoptysis; No shortness of breath  CARDIOVASCULAR: Syncopal events, neurology consulted. No chest pain, palpitations, or current dizziness.  GASTROINTESTINAL: Sensation of constipation, states she has a hx of chronic constipation and takes suppositories. No abdominal or epigastric pain. No nausea, vomiting, or hematemesis; No diarrhea or constipation. No melena or hematochezia.  GENITOURINARY: No dysuria, frequency, hematuria, or incontinence  NEUROLOGICAL: No headaches, memory loss, loss of strength, numbness, or tremors  MUSCULOSKELETAL: Back pain, lower back, above the hip joint, as described below. No joint pain or swelling; No muscle or extremity pain.   PSYCHIATRIC: Hx of SI this admit, psych consulted. Anxious on exam, stressed about events surrounding her care.   HEME/LYMPH: Easy bruising, on Elliquis.     Vital Signs Last 24 Hrs  T(C): 36, Max: 36.8 (05-21 @ 21:44)  T(F): 96.8, Max: 98.2 (05-21 @ 21:44)  HR: 77 (66 - 88)  BP: 97/45 (93/51 - 142/67)  BP(mean): --  RR: 16 (16 - 16)  SpO2: 98% (96% - 98%)    FUNCTIONAL ASSESSMENT:  PAIN SCORE AT REST:    2-3/10     SCALE USED: (1-10 VNRS)  Comment: Pain is least at rest, laying on back, Rside vs. left  PAIN SCORE WITH ACTIVITY:  6-8/10       SCALE USED: (1-10 VNRS)  Comment: Pain depends on position, pt cannot discriminate exact position that causes onset of acute pain, its onset is inconsistent.    PQRST:   Pt describes pain as sharp, inconsistent, L>R, located at low back, above hip joints, consistent with T12 dermatome--wrapping around abdomen. Denies associated numbness/tingling/stiffness/cramping. Pt states she is unable to tell the degree of radicular pain around her abdomen d/t constipation. Pt states that pain is alleviated by Percocet, although it makes her lightheaded; worsened with certain positions (still has difficulty discriminating which positions worsen her pain).    GENERAL: NAD, well-groomed, well-developed  HEAD:  Atraumatic, Normocephalic  EYES: EOMI, PERRLA, 2mm b/l, conjunctiva and sclera clear  NECK: Supple  NERVOUS SYSTEM:    Alert & Oriented X3, Good concentration;   Cranial nerves grossly intact  Motor Strength 5/5 B/L upper and lower extremities;   DTRs 2+ intact and symmetric  Sensation intact to LT in UE/LE in 3 dermatomes  Thoracolumbar: No thoracic/lumbar tenderness at level of comrpession fracture. Negative straight leg raise. Negative crossed straight leg raise. Negative Davidson's sign.   CHEST/LUNG: Clear to auscultation bilaterally; No rales, rhonchi, wheezing, or rubs  HEART: Regular rate and rhythm; No murmurs, rubs, or gallops  ABDOMEN: Soft, Nontender, Nondistended; Bowel sounds present; hyperactive  SKIN: No rashes or lesions    LABS:                        13.0   6.2   )-----------( 43       ( 22 May 2017 07:31 )             39.0     05-22    141  |  104  |  13  ----------------------------<  92  3.9   |  28  |  0.80    Ca    8.4      22 May 2017 07:31  Mg     2.0     05-22            RADIOLOGY:    Drug Screen:        ASSESSMENT:       PLAN:   1. Opioids    Since yesterday 6am, pt has required: 2x doses of Percocet 5mg q4h PRN. Pt is on 25mg Tramadol standing daily. Pt has been on same regimen since 05/20.     PLAN: C/w . Transition to . LA not required. Pt receiving adequete coverage. First step. For rescue dosing, will order .      2. Neuropathics  Pt currently denies neuropathic pain. No numbness/tingling/electric shock like sensation in LE/UE b.l.    PLAN: No indication for neuropathic agents.     3. Adjuvants  Pt ___ complaining of muscle spasms/cramping in neck/back. Tylenol 650mg q6h PRN for Mild Pain. Pt on/not on Percocet.     PLAN: C/w     4. Prophylactic:   Bowel regimen: Docusate Senna;   RECOMMEND: Addition of PRN Constipation     Nausea PRN: Zofran PRN for Nausea, pt does not c/o current    5. Functional Goals:   Pt will get OOB with PT today. Pt will resume previous level of activity without impairment from surgery.     6. Additional Consults:   None recommended.     7. Additional Labs/Imaging:   None recommended.     8. Follow up, Discharge Planning:   Patient is set for discharge to:   Discharge is pending:   Pain Management follow up plan: NEUROSURGERY PAIN MANAGEMENT CONSULT NOTE    Chief Complaint: Low back pain    HPI:  Patient is an 86yo F, occasional marijuana user, with PMHx afib (on Eliquis, s/p failed DCCV with amiodarone and subsequent conversion to NSR), hypothyroidism, hypotension, vasovagal syncope, macular degeneration s/p b/l eye injections, mitral valve repair in 2004, chronic diastolic heart failure (EF 55%, mild-moderate AR/MR) with recent admission at [facility] 4/30-5/2 for syncope workup who now presents to [facility] ED 5/18/17 complaining of syncopal episode. On the prior admission, patient troponin trend 0.115->0.121 -> 0.129. Hematology, Dr. Kyle consulted for low platelet count and patient confirmed to have thrombocytopenia with platelet count of 60. Patient was recommended for outpatient bone marrow biopsy with Dr. Kyle. Dr. Menchaca, EP, consulted who recommended loop recorder placement, BB and amio. However, patient refusing all. Patient underwent echocardiogram revealing EF 60-65%, LA mildly dilated, interatrial septal aneurysm, mild AI, mitral ring in place with mild-mod MR. Carotid U/S also performed revealing no significant stenosis. Physical Therapy evaluated pt and no home needs but may f/u with her Cardiologist regarding possible Vestibular rehab. Patient now reports she was standing in her kitchen at the counter, felt "flushed" and lost consciousness. Patient believes she slid down between two cabinets and fell on her but/lower back. She awoke on the floor in the sitting position. Patient also reports b/l ocular migraines with + photophobia that come and go on their own for the last week. Of note, patient recently stopped her meclizine yesterday and reports that it did improve her dizziness somewhat. Patient saw a neurologist Dr. Kessler, who recommended she follow up with an MRI brain. She also saw and ENT doctor who recommended a balance test which she was unable to get an appointment for until June. Patient denies fatigue, SOB, PND, orthopnea, N/V, hematochezia, melena. On arrival to the ED, VS  Temp 96.5F, HR 90bpm, /72, RR 16, O2 sat 99% RA, orthostatics positive from sitting to standing. Labs significant for WBC 10.9 with left shift, platelets 15K with stable Hgb/Hct, trops neg x 1. UA negative. CT abd/pelvis revealed acute compression fracture of the T12 vertebral body with retropulsion of the superior posterior endplate into the spinal canal, no evidence of retroperitoneal hematoma. Patient is now admitted to [facility] for monitoring on telemetry and further workup. (18 May 2017 17:51)      PAST MEDICAL & SURGICAL HISTORY:  Vasovagal syncope  Migraines  Hypotension  Hypothyroidism  A-fib  H/O mitral valve repair      FAMILY HISTORY:  Non-contributory    HOME MEDICATIONS:   Please refer to initial HNP    PAIN HOME MEDICATIONS:  None    Allergies    latex (Other)  Orange Juice (Other)  penicillin (Angioedema)    Intolerances        PAIN MEDICATIONS:  meclizine 12.5milliGRAM(s) Oral three times a day  oxyCODONE  5 mG/acetaminophen 325 mG 1Tablet(s) Oral every 4 hours PRN  traMADol 25milliGRAM(s) Oral daily    Heme:  apixaban 2.5milliGRAM(s) Oral two times a day    Antibiotics:    Cardiovascular:    GI:  senna 2Tablet(s) Oral at bedtime  docusate sodium 100milliGRAM(s) Oral two times a day    Endocrine:  levothyroxine 50MICROGram(s) Oral daily  estrogens    conjugated 0.3milliGRAM(s) Oral daily    All Other Medications:  potassium chloride    Tablet ER 20milliEquivalent(s) Oral once      REVIEW OF SYSTEMS:  CONSTITUTIONAL: No fever, weight loss, or fatigue  EYES: No eye pain, visual disturbances, or discharge  ENMT:  No difficulty hearing, tinnitus, vertigo; No sinus or throat pain  NECK: No pain or stiffness  BREASTS: No pain, masses, or nipple discharge  RESPIRATORY: No cough, wheezing, chills or hemoptysis; No shortness of breath  CARDIOVASCULAR: Syncopal events, neurology consulted. No chest pain, palpitations, or current dizziness.  GASTROINTESTINAL: Sensation of constipation, states she has a hx of chronic constipation and takes suppositories. No abdominal or epigastric pain. No nausea, vomiting, or hematemesis; No diarrhea or constipation. No melena or hematochezia.  GENITOURINARY: No dysuria, frequency, hematuria, or incontinence  NEUROLOGICAL: No headaches, memory loss, loss of strength, numbness, or tremors  MUSCULOSKELETAL: Back pain, lower back, above the hip joint, as described below. No joint pain or swelling; No muscle or extremity pain.   PSYCHIATRIC: Hx of SI this admit, psych consulted. Anxious on exam, stressed about events surrounding her care.   HEME/LYMPH: Easy bruising, on Elliquis.     Vital Signs Last 24 Hrs  T(C): 36, Max: 36.8 (05-21 @ 21:44)  T(F): 96.8, Max: 98.2 (05-21 @ 21:44)  HR: 77 (66 - 88)  BP: 97/45 (93/51 - 142/67)  BP(mean): --  RR: 16 (16 - 16)  SpO2: 98% (96% - 98%)    FUNCTIONAL ASSESSMENT:  PAIN SCORE AT REST:    2-3/10     SCALE USED: (1-10 VNRS)  Comment: Pain is least at rest, laying on back, Rside vs. left  PAIN SCORE WITH ACTIVITY:  6-8/10       SCALE USED: (1-10 VNRS)  Comment: Pain depends on position, pt cannot discriminate exact position that causes onset of acute pain, its onset is inconsistent.    PQRST:   Pt describes pain as sharp, inconsistent, L>R, located at low back, above hip joints, consistent with T12 dermatome--wrapping around abdomen. Denies associated numbness/tingling/stiffness/cramping. Pt states she is unable to tell the degree of radicular pain around her abdomen d/t constipation. Pt states that pain is alleviated by Percocet, although it makes her lightheaded; worsened with certain positions (still has difficulty discriminating which positions worsen her pain).    GENERAL: NAD, well-groomed, well-developed  HEAD:  Atraumatic, Normocephalic  EYES: EOMI, PERRLA, 2mm b/l, conjunctiva and sclera clear  NECK: Supple  NERVOUS SYSTEM:    Alert & Oriented X3, Good concentration;   Cranial nerves grossly intact  Motor Strength 5/5 B/L upper and lower extremities;   DTRs 2+ intact and symmetric  Sensation intact to LT in UE/LE in 3 dermatomes  Thoracolumbar: No thoracic/lumbar tenderness at level of comrpession fracture. Negative straight leg raise. Negative crossed straight leg raise. Negative Davidson's sign.   CHEST/LUNG: Clear to auscultation bilaterally; No rales, rhonchi, wheezing, or rubs  HEART: Regular rate and rhythm; No murmurs, rubs, or gallops  ABDOMEN: Soft, Nontender, Nondistended; Bowel sounds present; hyperactive  SKIN: No rashes or lesions    LABS:                        13.0   6.2   )-----------( 43       ( 22 May 2017 07:31 )             39.0     05-22    141  |  104  |  13  ----------------------------<  92  3.9   |  28  |  0.80    Ca    8.4      22 May 2017 07:31  Mg     2.0     05-22            RADIOLOGY:  EXAM:  MR THORACIC SPINE WO CONTRAST                          PROCEDURE DATE:  05/19/2017                     INTERPRETATION:  PROCEDURE: MRI thoracic spine    INDICATION: Back pain    TECHNIQUE: Sagittal T1, T2, STIR, coronal T2 and axial T1 and T2 weighted   images of the thoracic were obtained.     COMPARISON: None    CORRELATION: CT lumbar spine 5/18/2017 and thoracic x-rays 5/18/2017 and   12/19/2006    FINDINGS: The MRI examination a mild compression fracture deformity of   T12 with approximately 4.5 mm of posterior displacement of fracture   fragment into the thecal sac. There is no compression of the cauda   equina. There is hypointense T1 and hyperintense T2 signal within the   superior endplate and body compatible with edema. The fracture line is   better seen on the prior CT study. There is mild prevertebral soft tissue   fullness. The rest of the vertebral body heights are maintained.    There is a kyphotic deformity of the thoracic spine centered at the T7/8   level. The rest of the vertebral body marrow signal is appropriate for   the patient's stated age. No abnormal signal is identified within the   thoracic cord. The conus medullaris ends at the T12/L1 level.     There are minimum disc bulges at the T1/2, T2/3, and T8/9 levels. There   is no spinal canal stenosis.    IMPRESSION: Acute compression fracture deformity of T12 with approximate   4.5 mm retropulsion of superior fracture fragment into the thecal sac. No   spinal cord compression.          ASSESSMENT:   85yo F w/ hx of fall d/t vasovagal episode, c/o LBP w/ non-op T12 compression fx,     PLAN:   1. Opioids    Since yesterday 6am, pt has required: 2x doses of Percocet 5mg q4h PRN. Pt is on 25mg Tramadol standing daily. Pt has been on same regimen since 05/20.     RECOMMEND: D/t concern for vasovagal episodes and additional CNS depressive effects of Percocet/Tramadol, recommend Tylenol #3 q8h PRN for Severe Pain. LA not currently recommended. For rescue dosing, would recommend IV Tylenol.     2. Neuropathics  Pt currently denies neuropathic pain. No numbness/tingling/electric shock like sensation in LE/UE b.l.    PLAN: No indication for neuropathic agents.     3. Adjuvants  Pt is not complaining of muscle spasms/cramping in neck/back. IV Tylenol 1g ordered PRN for Breakthrough Pain. NSAIDs not recommended d/t risk for bleed. Pt would benefit from Lidocaine patches d/t small body habitus.      PLAN: 2x Lidocaine patches on L low back, 1x Lidocaine patch or R low back. IV Tylenol for breakthrough pain. If pt has local irritation at site of Lidocaine patch can use topical OTC hydrocortisone cream.     4. Prophylactic:   Bowel regimen: Docusate Senna;   RECOMMEND: Addition of PRN Mirilax for constipation     Nausea PRN: Zofran PRN for Nausea 2/2 codeine, pt does not c/o current    5. Functional Goals:   Pt will resume previous level of activity without impairment from surgery.     6. Additional Consults:   None recommended.     7. Additional Labs/Imaging:   None recommended.     8. Follow up, Discharge Planning:   Patient is set for discharge to: Acute Rehab  Discharge is pending: Pain management/finalization of medical plan  Pain Management follow up plan:     Please f/u with Dr. Tulio Barrett for possible facet injections. [address] [place], [address]. Call to make an appointment: [phone]    For questions about plan: Call [phone] Sharlene Caldwell NP

## 2017-05-22 NOTE — DISCHARGE NOTE ADULT - CARE PROVIDERS DIRECT ADDRESSES
[email or web address],DirectAddress_Unknown

## 2017-05-22 NOTE — DISCHARGE NOTE ADULT - CARE PLAN
Principal Discharge DX:	Syncope  Goal:	continue medications, follow up  Instructions for follow-up, activity and diet:	Your blood pressure drops when you go from laying down to sitting up and from sitting to standing. Please take florinef 0.1mg daily and wear compression stockings. Maintain good oral intake. Continue meclizine to prevent dizziness. Please follow up with your doctor, Dr. Goldberg, and Dr. Bolden.  Secondary Diagnosis:	A-fib  Goal:	continue medications  Instructions for follow-up, activity and diet:	You are not currently in afib, but are in the normal heart rhythm. Please continue eliquis 2.5mg twice daily to prevent stroke. Follow up with Dr. Menchaca as needed for afib.  Secondary Diagnosis:	Compression fracture of thoracic vertebra  Goal:	wear your brace  Instructions for follow-up, activity and diet:	Please wear your brace when sitting up or walking around. Follow up with Dr. Kulkarni. Take tylenol #3 or regular tylenol for pain.  Secondary Diagnosis:	Thrombocytopenia  Goal:	follow up  Instructions for follow-up, activity and diet:	Please follow up with Dr. Kyle regarding your low platelets. Have your blood checked regularly by your doctor. For any bleeding, please call your doctor.  Secondary Diagnosis:	Depression  Goal:	follow up  Instructions for follow-up, activity and diet:	Please follow up weekly with your psychiatrist. If you feel very depressed or as if you want to die, please call your doctor or come to the emergency room.

## 2017-05-22 NOTE — CONSULT NOTE ADULT - CONSULT REASON
Syncope
Medicine  LATE ENTRY
Pain 2/2 t12 compression fx and syncope
T12 compression fracture
Medical evaluation

## 2017-05-22 NOTE — DISCHARGE NOTE ADULT - PATIENT PORTAL LINK FT
“You can access the Follow[facility] Patient Portal, offered by [facility], by registering with the following website: [email or web address]”

## 2017-05-22 NOTE — CONSULT NOTE ADULT - SUBJECTIVE AND OBJECTIVE BOX
Vascular Neurology Consultation    Reason for consult: Syncope    HPI:  Patient is an 84yo F, occasional marijuana user, with PMHx afib (on Eliquis, s/p failed DCCV with amiodarone and subsequent conversion to NSR), hypothyroidism, hypotension, vasovagal syncope, macular degeneration s/p b/l eye injections, mitral valve repair in 2004, chronic diastolic heart failure (EF 55%, mild-moderate AR/MR) with recent admission at [facility] 4/30-5/2 for syncope workup who now presents to [facility] ED 5/18/17 complaining of syncopal episode. On the prior admission, patient troponin trend 0.115->0.121 -> 0.129. Hematology, Dr. Kyle consulted for low platelet count and patient confirmed to have thrombocytopenia with platelet count of 60. Patient was recommended for outpatient bone marrow biopsy with Dr. Kyle. Dr. Menchaca, EP, consulted who recommended loop recorder placement, BB and amio. However, patient refusing all. Patient underwent echocardiogram revealing EF 60-65%, LA mildly dilated, interatrial septal aneurysm, mild AI, mitral ring in place with mild-mod MR. Carotid U/S also performed revealing no significant stenosis. Physical Therapy evaluated pt and no home needs but may f/u with her Cardiologist regarding possible Vestibular rehab. Patient now reports she was standing in her kitchen at the counter, felt "flushed" and lost consciousness. Patient believes she slid down between two cabinets and fell on her but/lower back. She awoke on the floor in the sitting position. Patient also reports b/l ocular migraines with + photophobia that come and go on their own for the last week. Of note, patient recently stopped her meclizine yesterday and reports that it did improve her dizziness somewhat. Patient saw a neurologist Dr. Kessler, who recommended she follow up with an MRI brain. She also saw and ENT doctor who recommended a balance test which she was unable to get an appointment for until June. Patient denies fatigue, SOB, PND, orthopnea, N/V, hematochezia, melena. On arrival to the ED, VS  Temp 96.5F, HR 90bpm, /72, RR 16, O2 sat 99% RA, orthostatics positive from sitting to standing. Labs significant for WBC 10.9 with left shift, platelets 15K with stable Hgb/Hct, trops neg x 1. UA negative. CT abd/pelvis revealed acute compression fracture of the T12 vertebral body with retropulsion of the superior posterior endplate into the spinal canal, no evidence of retroperitoneal hematoma. Patient is now admitted to 5 Ur for monitoring on telemetry and further workup. (18 May 2017 17:51)    PAST MEDICAL & SURGICAL HISTORY:  Vasovagal syncope  Migraines  Hypotension  Hypothyroidism  A-fib  H/O mitral valve repair    FAMILY HISTORY:    Social History:    Review of Systems:  Constitutional: No fever, weight loss or fatigue  Eyes: No eye pain, visual disturbances, or discharge  ENMT:  No difficulty hearing, tinnitus, vertigo; No sinus or throat pain  Neck: No pain or stiffness  Respiratory: No cough, wheezing, chills or hemoptysis  Cardiovascular: No chest pain, palpitations, shortness of breath, dizziness or leg swelling  Gastrointestinal: No abdominal pain. No nausea, vomiting or hematemesis; No diarrhea or constipation. Nohematochezia.  Genitourinary: No dysuria, frequency, hematuria or incontinence  Neurological: As per HPI  Skin: No itching, burning, rashes or lesions   Endocrine: No heat or cold intolerance; No hair loss  Musculoskeletal: No joint pain or swelling; No muscle, back or extremity pain  Psychiatric: No depression, anxiety, mood swings or difficulty sleeping  Heme/Lymph: No easy bruising or bleeding gums    MEDICATIONS  (STANDING):  meclizine 12.5milliGRAM(s) Oral three times a day  levothyroxine 50MICROGram(s) Oral daily  estrogens    conjugated 0.3milliGRAM(s) Oral daily  senna 2Tablet(s) Oral at bedtime  docusate sodium 100milliGRAM(s) Oral two times a day  apixaban 2.5milliGRAM(s) Oral two times a day    MEDICATIONS  (PRN):  acetaminophen 300 mG/codeine 30 mG 1Tablet(s) Oral every 8 hours PRN Severe Pain (7 - 10)  acetaminophen   Tablet. 650milliGRAM(s) Oral every 8 hours PRN Moderate Pain (4 - 6)    Allergies:  latex (Other)  Orange Juice (Other)  penicillin (Angioedema)    Vital Signs Last 24 Hrs  T(C): 35.6, Max: 36.8 (05-21 @ 21:44)  T(F): 96, Max: 98.2 (05-21 @ 21:44)  HR: 84 (71 - 88)  BP: 128/60 (97/45 - 142/67)  RR: 16 (16 - 16)  SpO2: 98% (96% - 98%)    Gen: No acute distress, well-nourished  CV: irregular rate and rhythm   Neuro:  Mental status: Awake, alert and oriented x3.  Recent and remote memory intact.  Naming, repetition and comprehension intact.  Attention/concentration intact.  No dysarthria, no aphasia.  Fund of knowledge appropriate.    Cranial nerves: Fundoscopic exam demonstrated no abnormalities, pupils equally round and reactive to light, visual fields full, no nystagmus, extraocular muscles intact, V1 through V3 intact bilaterally and symmetric, face symmetric, hearing intact to finger rub, palate elevation symmetric, tongue was midline, sternocleidomastoid/shoulder shrug strength bilaterally 5/5.    Motor:  Normal bulk and tone, strength 5/5 in bilateral upper and lower extremities.   strength 5/5.  Rapid alternating movements intact and symmetric.   Sensation: Intact to light touch, proprioception, vibration, temperature, pinprick.  No neglect.   Coordination: No dysmetria on finger-to-nose and heel-to-shin.  No clumsiness.  Reflexes: 2+ in upper and lower extremities, absent Babinski bilaterally  Gait: Narrow and steady. No ataxia.  Romberg negative    NIHSS:    Labs:                        13.0   6.2   )-----------( 43       ( 22 May 2017 07:31 )             39.0     05-22    141  |  104  |  13  ----------------------------<  92  3.9   |  28  |  0.80    Ca    8.4      22 May 2017 07:31  Mg     2.0     05-22    Radiology and Additional Studies:    5/19 MRI head w/o:   IMPRESSION: No hemorrhage. Minimum vessel ischemic disease without acute ischemia. Stable exam.    5/2 ECHO: Normal left ventricular size and wall thickness. The left ventricular wall motion is normal. The left ventricular ejection fraction is estimated to be 60-65%  The left atrium is moderately dilated. Theleft atrial volume index is 44 cc/m2 (normal <34cc/m2)  There is an inter atrial septal aneurysm.Right atrial size is normal.The right ventricle is normal in size and function.There is mild aortic regurgitation. No hemodynamically significant valvular aortic stenosis.  An annuloplasty ring is noted in the mitral position. There is mild to moderate mitral regurgitation. The effective regurgitant orifice, calculated by the PISA method, is 0.1 cm2. The regurgitant volume, based on the PISA calculation, is 13.5 ml.  There is moderate tricuspid regurgitation.There is no echocardiographic evidence for pulmonary hypertension. The pulmonary artery systolic pressure is estimated to be 31 mmHg. There is no pulmonic stenosis.  No aortic root dilatation.Normal   size aortic arch with no hemodynamic evidence for coarctationThe inferior vena cava is normal in size (<2.1 cm) with normal inspiratory collapse (>50%) consistent with normal right atrial pressure.There is no pericardial effusion.    Assessment & Plan: Vascular Neurology Consultation    Reason for consult: Syncope    HPI:  Patient is an 86yo F, occasional marijuana user, with PMHx afib (on Eliquis, s/p failed DCCV with amiodarone and subsequent conversion to NSR), hypothyroidism, hypotension, vasovagal syncope, macular degeneration s/p b/l eye injections, mitral valve repair in 2004, chronic diastolic heart failure (EF 55%, mild-moderate AR/MR) with recent admission at [facility] 4/30-5/2 for syncope workup who now presents to [facility] ED 5/18/17 complaining of syncopal episode. On the prior admission, patient troponin trend 0.115->0.121 -> 0.129. Hematology, Dr. Kyle consulted for low platelet count and patient confirmed to have thrombocytopenia with platelet count of 60. Patient was recommended for outpatient bone marrow biopsy with Dr. Kyle. Dr. Menchaca, EP, consulted who recommended loop recorder placement, BB and amio. However, patient refusing all. Patient underwent echocardiogram revealing EF 60-65%, LA mildly dilated, interatrial septal aneurysm, mild AI, mitral ring in place with mild-mod MR. Carotid U/S also performed revealing no significant stenosis. Physical Therapy evaluated pt and no home needs but may f/u with her Cardiologist regarding possible Vestibular rehab. Patient now reports she was standing in her kitchen at the counter, felt "flushed" and lost consciousness. Patient believes she slid down between two cabinets and fell on her but/lower back. She awoke on the floor in the sitting position. Patient also reports b/l ocular migraines with + photophobia that come and go on their own for the last week. Of note, patient recently stopped her meclizine yesterday and reports that it did improve her dizziness somewhat. Patient saw a neurologist Dr. Kessler, who recommended she follow up with an MRI brain. She also saw and ENT doctor who recommended a balance test which she was unable to get an appointment for until June. Patient denies fatigue, SOB, PND, orthopnea, N/V, hematochezia, melena. On arrival to the ED, VS  Temp 96.5F, HR 90bpm, /72, RR 16, O2 sat 99% RA, orthostatics positive from sitting to standing. Labs significant for WBC 10.9 with left shift, platelets 15K with stable Hgb/Hct, trops neg x 1. UA negative. CT abd/pelvis revealed acute compression fracture of the T12 vertebral body with retropulsion of the superior posterior endplate into the spinal canal, no evidence of retroperitoneal hematoma. Patient is now admitted to 5 Ur for monitoring on telemetry and further workup. (18 May 2017 17:51)    Pt seen and examined. Reports she is to be discharged today to [facility] [place] [facility] [place].   Pt able to recall above events, consistent history.   States she continues to have intermittent dizziness, present when attempting to change from lying to sitting or sitting to standing. Does not have dizziness or room spinning when resting.   Baseline with bilateral macular degeneration so baseline with poor vision, follows up closely with optho. Currently unchanged from baseline. No diplopia.   Reports mild left sided lower back pain at this time, controlled with pain regimen.   Denies slurred speech, difficulty word finding, nausea, vomiting, headache, unilateral weakness or numbness.     PAST MEDICAL & SURGICAL HISTORY:  Vasovagal syncope  Migraines  Hypotension  Hypothyroidism  A-fib  H/O mitral valve repair    FAMILY HISTORY: No pertinent family history.     Social History: Lives with . Able to feed, bathe, cook independently. Requires help with grocery shopping. Does not require cane or walker for ambulation. Denies smoking or ETOH use. Occasional marijuana user. Retired artist.     Review of Systems:  Constitutional: No fever, weight loss or fatigue  Eyes: No eye pain or discharge  ENMT:  No difficulty hearing, tinnitus, vertigo; No sinus or throat pain  Neck: No pain or stiffness  Respiratory: No cough, wheezing, chills or hemoptysis  Cardiovascular: No chest pain, palpitations, shortness of breath, dizziness or leg swelling  Gastrointestinal: No abdominal pain. No nausea, vomiting; No diarrhea or constipation.  Genitourinary: No dysuria, frequency, hematuria or incontinence  Neurological: As per HPI  Skin: No itching, burning, rashes or lesions   Endocrine: No heat or cold intolerance; No hair loss  Musculoskeletal: No joint pain or swelling;   Psychiatric: No depression, anxiety, mood swings or difficulty sleeping  Heme/Lymph: No easy bruising or bleeding gums    MEDICATIONS  (STANDING):  meclizine 12.5milliGRAM(s) Oral three times a day  levothyroxine 50MICROGram(s) Oral daily  estrogens    conjugated 0.3milliGRAM(s) Oral daily  senna 2Tablet(s) Oral at bedtime  docusate sodium 100milliGRAM(s) Oral two times a day  apixaban 2.5milliGRAM(s) Oral two times a day    MEDICATIONS  (PRN):  acetaminophen 300 mG/codeine 30 mG 1Tablet(s) Oral every 8 hours PRN Severe Pain (7 - 10)  acetaminophen   Tablet. 650milliGRAM(s) Oral every 8 hours PRN Moderate Pain (4 - 6)    Allergies:  latex (Other)  Orange Juice (Other)  penicillin (Angioedema)    Vital Signs Last 24 Hrs  T(C): 35.6, Max: 36.8 (05-21 @ 21:44)  T(F): 96, Max: 98.2 (05-21 @ 21:44)  HR: 84 (71 - 88)  BP: 128/60 (97/45 - 142/67)  RR: 16 (16 - 16)  SpO2: 98% (96% - 98%)    Gen: Sitting up in bed, calm, cooperative, in NAD   CV: irregular rate and rhythm   Neuro:  Mental status: Awake, alert and oriented x3.  Recent and remote memory intact.  Naming, repetition and comprehension intact.  Attention/concentration intact.  No dysarthria, no aphasia.  Fund of knowledge appropriate.    Cranial nerves: pupils equally round and reactive to light, 3 mm, visual fields full, no nystagmus, no ptosis, extraocular muscles intact, V1 through V3 intact bilaterally and symmetric, face symmetric, hearing intact to finger rub, palate elevation symmetric, tongue was midline, sternocleidomastoid/shoulder shrug strength bilaterally 5/5.    Motor:  No pronator drift of upper or lower extremities.  strength strong bilaterally. Normal bulk and tone, strength 5/5 in bilateral upper and lower extremities.   Sensation: Intact to light touch, proprioception, vibration, temperature, pinprick.  No neglect.   Coordination: No dysmetria on finger-to-nose and heel-to-shin. No clumsiness. Rapid alternating movements intact and symmetric.   Reflexes: 2+ in upper and lower extremities, absent Babinski bilaterally  Gait: Not assessed at this time     NIHSS: 0    Labs:                        13.0   6.2   )-----------( 43       ( 22 May 2017 07:31 )             39.0     05-22    141  |  104  |  13  ----------------------------<  92  3.9   |  28  |  0.80    Ca    8.4      22 May 2017 07:31  Mg     2.0     05-22    Radiology and Additional Studies:    5/19 MRI head w/o:   IMPRESSION: No hemorrhage. Minimum vessel ischemic disease without acute ischemia. Stable exam.    5/19 MRI thoracic spine:   IMPRESSION: Acute compression fracture deformity of T12 with approximate 4.5 mm retropulsion of superior fracture fragment into the thecal sac. No spinal cord compression.    5/2 ECHO: Normal left ventricular size and wall thickness. The left ventricular wall motion is normal. The left ventricular ejection fraction is estimated to be 60-65%  The left atrium is moderately dilated. Theleft atrial volume index is 44 cc/m2 (normal <34cc/m2)  There is an inter atrial septal aneurysm.Right atrial size is normal.The right ventricle is normal in size and function.There is mild aortic regurgitation. No hemodynamically significant valvular aortic stenosis.  An annuloplasty ring is noted in the mitral position. There is mild to moderate mitral regurgitation. The effective regurgitant orifice, calculated by the PISA method, is 0.1 cm2. The regurgitant volume, based on the PISA calculation, is 13.5 ml.  There is moderate tricuspid regurgitation.There is no echocardiographic evidence for pulmonary hypertension. The pulmonary artery systolic pressure is estimated to be 31 mmHg. There is no pulmonic stenosis.  No aortic root dilatation.Normal size aortic arch with no hemodynamic evidence for coarctationThe inferior vena cava is normal in size (<2.1 cm) with normal inspiratory collapse (>50%) consistent with normal right atrial pressure.There is no pericardial effusion.    5/2 Ultrasound carotids:   VERTEBRAL ARTERIES:   Antegrade flow was seenwithin both vertebral arteries.  IMPRESSION:  No evidence of hemodynamically significant stenosis.    Assessment & Plan:  86 year old right handed female with h/o pAFIB (on apixaban), hypothyroidism, vasovagal syncope,  macular degeneration, MV repair in 2004, p/w syncope with prodromes consistent with vasovagal nature and found to have T12 acute fracture without spinal cord compression.     -positive orthostatic hypotension noted on vitals   -MRI head w/o luis without acute ischemic stroke, no hemorrhage, minimal vessel ischemic disease   -continue with apixaban given AFib

## 2017-05-22 NOTE — DISCHARGE NOTE ADULT - PLAN OF CARE
continue medications, follow up Your blood pressure drops when you go from laying down to sitting up and from sitting to standing. Please take florinef 0.1mg daily and wear compression stockings. Maintain good oral intake. Continue meclizine to prevent dizziness. Please follow up with your doctor, Dr. Goldberg, and Dr. Bolden. continue medications You are not currently in afib, but are in the normal heart rhythm. Please continue eliquis 2.5mg twice daily to prevent stroke. Follow up with Dr. Menchaca as needed for afib. wear your brace Please wear your brace when sitting up or walking around. Follow up with Dr. Kulkarni. Take tylenol #3 or regular tylenol for pain. follow up Please follow up with Dr. Kyle regarding your low platelets. Have your blood checked regularly by your doctor. For any bleeding, please call your doctor. Please follow up weekly with your psychiatrist. If you feel very depressed or as if you want to die, please call your doctor or come to the emergency room.

## 2017-05-22 NOTE — DISCHARGE NOTE ADULT - CARE PROVIDER_API CALL
Suman Kulkarni), Neurological Surgery  [address] [address]  Phone: [phone]  Fax: [phone]    Neha Bolden), Neurology  [address] [address]  Phone: [phone]  Fax: [phone]    Mitzi Kyle), Medicine  [address] [place] [address]  Phone: [phone]  Fax: [phone]

## 2017-05-22 NOTE — CONSULT NOTE ADULT - SUBJECTIVE AND OBJECTIVE BOX
CLAUDIA HERNANDEZ      Patient is a 86y old  Female who presents with a chief complaint of syncope (22 May 2017 13:52)      HPI:  Patient is an 84yo F, occasional marijuana user, with PMHx afib (on Eliquis, s/p failed DCCV with amiodarone and subsequent conversion to NSR), hypothyroidism, hypotension, vasovagal syncope, macular degeneration s/p b/l eye injections, mitral valve repair in 2004, chronic diastolic heart failure (EF 55%, mild-moderate AR/MR) with recent admission at [facility] 4/30-5/2 for syncope workup who now presents to [facility] ED 5/18/17 complaining of syncopal episode. On the prior admission, patient troponin trend 0.115->0.121 -> 0.129. Hematology, Dr. Kyle consulted for low platelet count and patient confirmed to have thrombocytopenia with platelet count of 60. Patient was recommended for outpatient bone marrow biopsy with Dr. Kyle. Dr. Menchaca, EP, consulted who recommended loop recorder placement, BB and amio. However, patient refusing all. Patient underwent echocardiogram revealing EF 60-65%, LA mildly dilated, interatrial septal aneurysm, mild AI, mitral ring in place with mild-mod MR. Carotid U/S also performed revealing no significant stenosis. Physical Therapy evaluated pt and no home needs but may f/u with her Cardiologist regarding possible Vestibular rehab. Patient now reports she was standing in her kitchen at the counter, felt "flushed" and lost consciousness. Patient believes she slid down between two cabinets and fell on her but/lower back. She awoke on the floor in the sitting position. Patient also reports b/l ocular migraines with + photophobia that come and go on their own for the last week. Of note, patient recently stopped her meclizine yesterday and reports that it did improve her dizziness somewhat. Patient saw a neurologist Dr. Kessler, who recommended she follow up with an MRI brain. She also saw and ENT doctor who recommended a balance test which she was unable to get an appointment for until June. Patient denies fatigue, SOB, PND, orthopnea, N/V, hematochezia, melena. On arrival to the ED, VS  Temp 96.5F, HR 90bpm, /72, RR 16, O2 sat 99% RA, orthostatics positive from sitting to standing. Labs significant for WBC 10.9 with left shift, platelets 15K with stable Hgb/Hct, trops neg x 1. UA negative. CT abd/pelvis revealed acute compression fracture of the T12 vertebral body with retropulsion of the superior posterior endplate into the spinal canal, no evidence of retroperitoneal hematoma. Patient is now admitted to [facility] for monitoring on telemetry and further workup. (18 May 2017 17:51)      Addl  Medical issues:       HEALTH ISSUES - PROBLEM Dx:  Generalized anxiety disorder  Borderline personality disorder  Suicidal ideation: Suicidal ideation  Compression fracture of thoracic vertebra: Compression fracture of thoracic vertebra  Discharge planning issues: Discharge planning issues  Edema of lower extremity: Edema of lower extremity  Hypothyroidism: Hypothyroidism  Chronic diastolic congestive heart failure: Chronic diastolic congestive heart failure  A-fib: A-fib  Thrombocytopenia: Thrombocytopenia  Compression fracture of twelfth thoracic vertebra, initial encounter: Compression fracture of twelfth thoracic vertebra, initial encounter  Syncope: Syncope            MEDICATIONS  (STANDING):  meclizine 12.5milliGRAM(s) Oral three times a day  levothyroxine 50MICROGram(s) Oral daily  estrogens    conjugated 0.3milliGRAM(s) Oral daily  senna 2Tablet(s) Oral at bedtime  docusate sodium 100milliGRAM(s) Oral two times a day  apixaban 2.5milliGRAM(s) Oral two times a day  fludroCORTISONE 0.1milliGRAM(s) Oral daily    MEDICATIONS  (PRN):  acetaminophen 300 mG/codeine 30 mG 1Tablet(s) Oral every 8 hours PRN Severe Pain (7 - 10)  acetaminophen   Tablet. 650milliGRAM(s) Oral every 8 hours PRN Moderate Pain (4 - 6)          PAST MEDICAL & SURGICAL HISTORY:  Vasovagal syncope  Migraines  Hypotension  Hypothyroidism  A-fib  H/O mitral valve repair      REVIEW OF SYSTEMS:  [x] As per HPI          Reviewed   no change                            Changes noted  CONSTITUTIONAL: No fever, weight loss, or fatigue  RESPIRATORY: No cough, wheezing, chills or hemoptysis; No Shortness of Breath  CARDIOVASCULAR: No chest pain, palpitations, dizziness, or leg swelling  GASTROINTESTINAL: No abdominal or epigastric pain. No nausea, vomiting, or hematemesis; No diarrhea or constipation. No melena or hematochezia.  MUSCULOSKELETAL: No joint pain or swelling; No muscle, back, or extremity pain  Neuro:   Grossly  Negative  Psych        Awake  alert  [x] All others negative	  [ ] Unable to obtain      Vital Signs Last 24 Hrs  T(C): 35.6, Max: 36.8 (05-21 @ 21:44)  T(F): 96, Max: 98.2 (05-21 @ 21:44)  HR: 84 (71 - 86)  BP: 128/60 (97/45 - 142/67)  BP(mean): --  RR: 16 (16 - 16)  SpO2: 98% (98% - 98%)    PHYSICAL EXAM:      Constitutional:    Eyes:    ENMT:    Neck:    Breasts:    Back:    Respiratory:    Cardiovascular:    Gastrointestinal:    Genitourinary:    Rectal:    Extremities:    Vascular:    Neurological:    Skin:    Lymph Nodes:    Musculoskeletal:    Psychiatric:                              13.0   6.2   )-----------( 43       ( 22 May 2017 07:31 )             39.0     05-22    141  |  104  |  13  ----------------------------<  92  3.9   |  28  |  0.80    Ca    8.4      22 May 2017 07:31  Mg     2.0     05-22            CAPILLARY BLOOD GLUCOSE      I&O's Summary    I & Os for current day (as of 22 May 2017 18:44)  =============================================  IN: 600 ml / OUT: 0 ml / NET: 600 ml          ASSESSMENT/PLAN/RECOMMENDATIONS

## 2017-05-22 NOTE — CONSULT NOTE ADULT - ASSESSMENT
84 y/o F with h/o pAFIB, hypothyroidism, vasovagal syncope, macular degeneration, MV repair in 2004, p/w syncope with prodromes consistent with vasovagal nature and found to have T12 acute fracture without spinal cord compression.

## 2017-05-22 NOTE — PROGRESS NOTE ADULT - SUBJECTIVE AND OBJECTIVE BOX
HPI:  Patient is an 84yo F, occasional marijuana user, with PMHx afib (on Eliquis, s/p failed DCCV with amiodarone and subsequent conversion to NSR), hypothyroidism, hypotension, vasovagal syncope, macular degeneration s/p b/l eye injections, mitral valve repair in 2004, chronic diastolic heart failure (EF 55%, mild-moderate AR/MR) with recent admission at [facility] 4/30-5/2 for syncope workup who now presents to [facility] ED 5/18/17 complaining of syncopal episode. On the prior admission, patient troponin trend 0.115->0.121 -> 0.129. Hematology, Dr. Kyle consulted for low platelet count and patient confirmed to have thrombocytopenia with platelet count of 60. Patient was recommended for outpatient bone marrow biopsy with Dr. Kyle. Dr. Menchaca, EP, consulted who recommended loop recorder placement, BB and amio. However, patient refusing all. Patient underwent echocardiogram revealing EF 60-65%, LA mildly dilated, interatrial septal aneurysm, mild AI, mitral ring in place with mild-mod MR. Carotid U/S also performed revealing no significant stenosis. Physical Therapy evaluated pt and no home needs but may f/u with her Cardiologist regarding possible Vestibular rehab. Patient now reports she was standing in her kitchen at the counter, felt "flushed" and lost consciousness. Patient believes she slid down between two cabinets and fell on her but/lower back. She awoke on the floor in the sitting position. Patient also reports b/l ocular migraines with + photophobia that come and go on their own for the last week. Of note, patient recently stopped her meclizine yesterday and reports that it did improve her dizziness somewhat. Patient saw a neurologist Dr. Kessler, who recommended she follow up with an MRI brain. She also saw and ENT doctor who recommended a balance test which she was unable to get an appointment for until June. Patient denies fatigue, SOB, PND, orthopnea, N/V, hematochezia, melena. On arrival to the ED, VS  Temp 96.5F, HR 90bpm, /72, RR 16, O2 sat 99% RA, orthostatics positive from sitting to standing. Labs significant for WBC 10.9 with left shift, platelets 15K with stable Hgb/Hct, trops neg x 1. UA negative. CT abd/pelvis revealed acute compression fracture of the T12 vertebral body with retropulsion of the superior posterior endplate into the spinal canal, no evidence of retroperitoneal hematoma. Patient is now admitted to [facility] for monitoring on telemetry and further workup. (18 May 2017 17:51)    FAMILY HISTORY:    MEDICATIONS  (STANDING):  meclizine 12.5milliGRAM(s) Oral three times a day  levothyroxine 50MICROGram(s) Oral daily  estrogens    conjugated 0.3milliGRAM(s) Oral daily  senna 2Tablet(s) Oral at bedtime  docusate sodium 100milliGRAM(s) Oral two times a day  apixaban 2.5milliGRAM(s) Oral two times a day  traMADol 25milliGRAM(s) Oral daily    MEDICATIONS  (PRN):  oxyCODONE  5 mG/acetaminophen 325 mG 1Tablet(s) Oral every 4 hours PRN Moderate Pain (4 - 6)    Vital Signs Last 24 Hrs  T(C): 35.8, Max: 36.8 (05-20 @ 21:51)  T(F): 96.5, Max: 98.3 (05-20 @ 21:51)  HR: 66 (66 - 90)  BP: 93/51 (93/51 - 136/67)  BP(mean): --  RR: 16 (14 - 16)  SpO2: 97% (97% - 100%)    Physical exam:    Overall impression  Lymphadenopathy  Liver  spleen    Labs:  CBC Full  -  ( 21 May 2017 08:15 )  WBC Count : 7.9 K/uL  Hemoglobin : 13.5 g/dL  Hematocrit : 40.5 %  Platelet Count - Automated : 46 K/uL  Mean Cell Volume : 88.4 fL  Mean Cell Hemoglobin : 29.5 pg  Mean Cell Hemoglobin Concentration : 33.3 g/dL  Auto Neutrophil # : x  Auto Lymphocyte # : x  Auto Monocyte # : x  Auto Eosinophil # : x  Auto Basophil # : x  Auto Neutrophil % : x  Auto Lymphocyte % : x  Auto Monocyte % : x  Auto Eosinophil % : x  Auto Basophil % : x    05-21    133<L>  |  98  |  10  ----------------------------<  91  3.8   |  26  |  0.70    Ca    8.4      21 May 2017 08:15  Mg     2.0     05-21        Radiology:  HEALTH ISSUES - R/O PROBLEM Dx:      Assessmant / Problem  Thrombocytopenia in   the 93k range (done in blue top tube) which done in purple top tube appears in the 40k   Refuses bone marrow exam    Plan:  only monitor CBC  AC can be given     Thank you  Mitzi Kyle MD
HPI:  Patient is an 86yo F, occasional marijuana user, with PMHx afib (on Eliquis, s/p failed DCCV with amiodarone and subsequent conversion to NSR), hypothyroidism, hypotension, vasovagal syncope, macular degeneration s/p b/l eye injections, mitral valve repair in 2004, chronic diastolic heart failure (EF 55%, mild-moderate AR/MR) with recent admission at [facility] 4/30-5/2 for syncope workup who now presents to [facility] ED 5/18/17 complaining of syncopal episode. On the prior admission, patient troponin trend 0.115->0.121 -> 0.129. Hematology, Dr. Kyle consulted for low platelet count and patient confirmed to have thrombocytopenia with platelet count of 60. Patient was recommended for outpatient bone marrow biopsy with Dr. Kyle. Dr. Menchaca, EP, consulted who recommended loop recorder placement, BB and amio. However, patient refusing all. Patient underwent echocardiogram revealing EF 60-65%, LA mildly dilated, interatrial septal aneurysm, mild AI, mitral ring in place with mild-mod MR. Carotid U/S also performed revealing no significant stenosis. Physical Therapy evaluated pt and no home needs but may f/u with her Cardiologist regarding possible Vestibular rehab. Patient now reports she was standing in her kitchen at the counter, felt "flushed" and lost consciousness. Patient believes she slid down between two cabinets and fell on her but/lower back. She awoke on the floor in the sitting position. Patient also reports b/l ocular migraines with + photophobia that come and go on their own for the last week. Of note, patient recently stopped her meclizine yesterday and reports that it did improve her dizziness somewhat. Patient saw a neurologist Dr. Kessler, who recommended she follow up with an MRI brain. She also saw and ENT doctor who recommended a balance test which she was unable to get an appointment for until June. Patient denies fatigue, SOB, PND, orthopnea, N/V, hematochezia, melena. On arrival to the ED, VS  Temp 96.5F, HR 90bpm, /72, RR 16, O2 sat 99% RA, orthostatics positive from sitting to standing. Labs significant for WBC 10.9 with left shift, platelets 15K with stable Hgb/Hct, trops neg x 1. UA negative. CT abd/pelvis revealed acute compression fracture of the T12 vertebral body with retropulsion of the superior posterior endplate into the spinal canal, no evidence of retroperitoneal hematoma. Patient is now admitted to [facility] for monitoring on telemetry and further workup. (18 May 2017 17:51)    FAMILY HISTORY:    MEDICATIONS  (STANDING):  meclizine 12.5milliGRAM(s) Oral three times a day  levothyroxine 50MICROGram(s) Oral daily  estrogens    conjugated 0.3milliGRAM(s) Oral daily  senna 2Tablet(s) Oral at bedtime  docusate sodium 100milliGRAM(s) Oral two times a day  apixaban 2.5milliGRAM(s) Oral two times a day  traMADol 25milliGRAM(s) Oral daily    MEDICATIONS  (PRN):  oxyCODONE  5 mG/acetaminophen 325 mG 1Tablet(s) Oral every 4 hours PRN Moderate Pain (4 - 6)    Vital Signs Last 24 Hrs  T(C): 36, Max: 36.8 (05-21 @ 21:44)  T(F): 96.8, Max: 98.2 (05-21 @ 21:44)  HR: 84 (71 - 88)  BP: 128/60 (97/45 - 142/67)  BP(mean): --  RR: 16 (16 - 16)  SpO2: 98% (96% - 98%)    Physical exam:    Overall impression  Lymphadenopathy  Liver  spleen    Labs:  CBC Full  -  ( 22 May 2017 07:31 )  WBC Count : 6.2 K/uL  Hemoglobin : 13.0 g/dL  Hematocrit : 39.0 %  Platelet Count - Automated : 43 K/uL  Mean Cell Volume : 87.8 fL  Mean Cell Hemoglobin : 29.3 pg  Mean Cell Hemoglobin Concentration : 33.3 g/dL  Auto Neutrophil # : x  Auto Lymphocyte # : x  Auto Monocyte # : x  Auto Eosinophil # : x  Auto Basophil # : x  Auto Neutrophil % : x  Auto Lymphocyte % : x  Auto Monocyte % : x  Auto Eosinophil % : x  Auto Basophil % : x    05-22    141  |  104  |  13  ----------------------------<  92  3.9   |  28  |  0.80    Ca    8.4      22 May 2017 07:31  Mg     2.0     05-22        Radiology:  HEALTH ISSUES - R/O PROBLEM Dx:      Assessmant / Problems  Mild thrombocytopenia 90k range in blue top tube, 42k in purple top tube  no bleeding  Continue AC    Plan:    Thank you  Mitzi Kyle MD
[facility] PA Progress Note    S: still c/o some back pain and constipation. Denies CP / SOB / palpitations    O: T(C): 36.3, Max: 37.4 (05-19 @ 22:02)  HR: 84 (78 - 95)  BP: 136/67 (118/58 - 152/67)  RR: 14 (14 - 18)  SpO2: 100% (95% - 100%)    TELE: NSR, PACs.  HR 70-80s.     PHYSICAL  General:  NAD        Chest:  CTA B/L  Cardiac:  RRR, + s1/s2   Abdomen:  +BS , soft ND/NT  Extremities: No LE edema  Neuro: AAO x 3    LABS:                        13.6   8.5   )-----------( 35       ( 20 May 2017 08:18 )             39.9     05-20    136  |  100  |  10  ----------------------------<  100<H>  4.4   |  27  |  0.70    Ca    8.6      20 May 2017 08:18  Mg     2.1     05-20          MEDICATIONS:  meclizine 12.5milliGRAM(s) Oral three times a day  levothyroxine 50MICROGram(s) Oral daily  estrogens    conjugated 0.3milliGRAM(s) Oral daily  oxyCODONE  5 mG/acetaminophen 325 mG 1Tablet(s) Oral every 4 hours PRN  senna 2Tablet(s) Oral at bedtime  docusate sodium 100milliGRAM(s) Oral two times a day  apixaban 2.5milliGRAM(s) Oral two times a day  traMADol 25milliGRAM(s) Oral daily
Interventional Cardiology PA Adult Progress Note    Subjective Assessment: Patient was seen and examined this AM and was emotionally distressed and tearful. Patient again explained to me her social situation at home (that she and her  are having difficulty performing ADLs) and endorsed SI.  	  MEDICATIONS:  meclizine 12.5milliGRAM(s) Oral three times a day  oxyCODONE  5 mG/acetaminophen 325 mG 1Tablet(s) Oral every 4 hours PRN  traMADol 25milliGRAM(s) Oral daily  senna 2Tablet(s) Oral at bedtime  docusate sodium 100milliGRAM(s) Oral two times a day  levothyroxine 50MICROGram(s) Oral daily  estrogens    conjugated 0.3milliGRAM(s) Oral daily  apixaban 2.5milliGRAM(s) Oral two times a day	    [PHYSICAL EXAM:  TELEMETRY:  T(C): 35.8, Max: 36.8 (05-20 @ 21:51)  HR: 90 (76 - 90)  BP: 103/51 (103/51 - 136/67)  RR: 16 (14 - 16)  SpO2: 97% (97% - 100%)  Wt(kg): --  I&O's Summary    I & Os for current day (as of 21 May 2017 10:35)  =============================================  IN: 180 ml / OUT: 0 ml / NET: 180 ml    Appearance: Normal	  HEENT:   Normal oral mucosa, PERRL, EOMI	  Neck: Supple, - JVD; No Carotid Bruit   Cardiovascular: Normal S1 S2, No JVD, No murmurs  Respiratory: Lungs clear to auscultation, No Rales, Rhonchi, Wheezing	  Gastrointestinal:  Soft, Non-tender, + BS	  Skin: No rashes, No ecchymoses, No cyanosis  Extremities: Normal range of motion, No clubbing, cyanosis or edema  Vascular: Peripheral pulses palpable 2+ bilaterally  Neurologic: Non-focal  Psychiatry: A & O x 3, Tearful, endorsing SI      LABS:	 	  CARDIAC MARKERS:                   13.5   7.9   )-----------( 46       ( 21 May 2017 08:15 )             40.5     05-21    133<L>  |  98  |  10  ----------------------------<  91  3.8   |  26  |  0.70    Ca    8.4      21 May 2017 08:15  Mg     2.0     05-21
Interventional Cardiology PA Adult Progress Note    Subjective: Pt c/o back pain. No CP, SOB, dizziness.  	    MEDICATIONS:  meclizine 12.5milliGRAM(s) Oral three times a day  oxyCODONE  5 mG/acetaminophen 325 mG 1Tablet(s) Oral every 4 hours PRN  levothyroxine 50MICROGram(s) Oral daily  estrogens    conjugated 0.3milliGRAM(s) Oral daily      PHYSICAL EXAM:  TELEMETRY:  T(C): 36.1, Max: 36.7 (05-19 @ 01:00)  HR: 82 (75 - 90)  BP: 135/63 (122/58 - 146/72)  RR: 16 (16 - 18)  SpO2: 98% (96% - 99%)  Wt(kg): --  I&O's Summary  I & Os for 24h ending 19 May 2017 07:00  =============================================  IN: 240 ml / OUT: 0 ml / NET: 240 ml    I & Os for current day (as of 19 May 2017 11:53)  =============================================  IN: 180 ml / OUT: 0 ml / NET: 180 ml    Height (cm): 162.6 (05-18 @ 19:10)  Weight (kg): 49.3 (05-18 @ 19:10)  BMI (kg/m2): 18.6 (05-18 @ 19:10)  BSA (m2): 1.51 (05-18 @ 19:10)  [place]:                                       General: NAD	  HEENT:   Normal oral mucosa, PERRL, EOMI	  Neck: Supple,  - JVD; no Carotid Bruit   Cardiovascular: Normal S1 S2, No JVD, No murmurs,   Respiratory: Lungs clear to auscultation	  Gastrointestinal:  Soft, Non-tender, + BS	  Extremities: Normal range of motion, No clubbing, cyanosis or edema  Vascular: Peripheral pulses palpable 2+ bilaterally  Neurologic: A + O x3, no focal deficits.      ECG:  	    Echocardiogram:  Catheterization:  Stress Test:    OTHER: 	    LABS:	 	                        14.8   10.8  )-----------( 107      ( 19 May 2017 11:35 )             43.7     05-19    140  |  102  |  10  ----------------------------<  93  4.2   |  27  |  0.70    Ca    8.7      19 May 2017 07:20  Mg     2.0     05-19    TPro  7.2  /  Alb  4.0  /  TBili  0.5  /  DBili  x   /  AST  33  /  ALT  17  /  AlkPhos  49  05-18    proBNP:   Lipid Profile:   HgA1c:   PT/INR - ( 18 May 2017 14:10 )   PT: 13.5 sec;   INR: 1.21          PTT - ( 18 May 2017 14:10 )  PTT:28.7 sec    ASSESSMENT/PLAN: 	        DVT ppx:  Dispo:
TLSO brace delivered today by vendor and fitted at bedside. Patient got OOB w/ PT in presence of vendor. MRI Thoracic spine w/ acute compression fracture with 4.5mm retropulsion of bone fragment without spinal cord compression.    Continue conservative therapy with pain medication, PT, and TLSO when OOB or active.    Can follow-up Dr. Kulkarni as outpatient.    Reconsult PRN

## 2017-05-24 DIAGNOSIS — R45.851 SUICIDAL IDEATIONS: ICD-10-CM

## 2017-05-24 DIAGNOSIS — R60.0 LOCALIZED EDEMA: ICD-10-CM

## 2017-05-24 DIAGNOSIS — Z79.01 LONG TERM (CURRENT) USE OF ANTICOAGULANTS: ICD-10-CM

## 2017-05-24 DIAGNOSIS — I34.0 NONRHEUMATIC MITRAL (VALVE) INSUFFICIENCY: ICD-10-CM
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
## 2017-05-24 DIAGNOSIS — D69.6 THROMBOCYTOPENIA, UNSPECIFIED: ICD-10-CM

## 2017-05-24 DIAGNOSIS — S22.088A OTHER FRACTURE OF T11-T12 VERTEBRA, INITIAL ENCOUNTER FOR CLOSED FRACTURE: ICD-10-CM

## 2017-05-24 DIAGNOSIS — R55 SYNCOPE AND COLLAPSE: ICD-10-CM
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
## 2017-05-24 DIAGNOSIS — F32.9 MAJOR DEPRESSIVE DISORDER, SINGLE EPISODE, UNSPECIFIED: ICD-10-CM

## 2017-05-24 DIAGNOSIS — H35.30 UNSPECIFIED MACULAR DEGENERATION: ICD-10-CM

## 2017-05-24 DIAGNOSIS — Z91.018 ALLERGY TO OTHER FOODS: ICD-10-CM

## 2017-05-24 DIAGNOSIS — I50.32 CHRONIC DIASTOLIC (CONGESTIVE) HEART FAILURE: ICD-10-CM

## 2017-05-24 DIAGNOSIS — Z91.040 LATEX ALLERGY STATUS: ICD-10-CM

## 2017-05-24 DIAGNOSIS — G43.909 MIGRAINE, UNSPECIFIED, NOT INTRACTABLE, WITHOUT STATUS MIGRAINOSUS: ICD-10-CM

## 2017-05-24 DIAGNOSIS — E03.9 HYPOTHYROIDISM, UNSPECIFIED: ICD-10-CM

## 2017-05-24 DIAGNOSIS — Z88.0 ALLERGY STATUS TO PENICILLIN: ICD-10-CM

## 2017-05-24 DIAGNOSIS — G62.9 POLYNEUROPATHY, UNSPECIFIED: ICD-10-CM

## 2017-05-24 DIAGNOSIS — I48.0 PAROXYSMAL ATRIAL FIBRILLATION: ICD-10-CM

## 2017-05-24 DIAGNOSIS — K59.00 CONSTIPATION, UNSPECIFIED: ICD-10-CM

## 2017-05-24 DIAGNOSIS — F12.10 CANNABIS ABUSE, UNCOMPLICATED: ICD-10-CM

## 2017-06-21 ENCOUNTER — FORM ENCOUNTER (OUTPATIENT)
Age: 82
End: 2017-06-21

## 2017-06-22 ENCOUNTER — APPOINTMENT (OUTPATIENT)
Dept: NEUROSURGERY | Facility: CLINIC | Age: 82
End: 2017-06-22

## 2017-06-22 ENCOUNTER — OUTPATIENT (OUTPATIENT)
Dept: OUTPATIENT SERVICES | Facility: HOSPITAL | Age: 82
LOS: 1 days | End: 2017-06-22
Payer: MEDICARE

## 2017-06-22 VITALS
BODY MASS INDEX: 19.88 KG/M2 | OXYGEN SATURATION: 97 % | DIASTOLIC BLOOD PRESSURE: 68 MMHG | WEIGHT: 108 LBS | SYSTOLIC BLOOD PRESSURE: 114 MMHG | HEIGHT: 62 IN | HEART RATE: 82 BPM

## 2017-06-22 DIAGNOSIS — Z98.89 OTHER SPECIFIED POSTPROCEDURAL STATES: Chronic | ICD-10-CM

## 2017-06-22 PROCEDURE — 72080 X-RAY EXAM THORACOLMB 2/> VW: CPT

## 2017-06-22 PROCEDURE — 72080 X-RAY EXAM THORACOLMB 2/> VW: CPT | Mod: 26

## 2017-07-07 ENCOUNTER — EMERGENCY (EMERGENCY)
Facility: HOSPITAL | Age: 82
LOS: 1 days | Discharge: HOME | End: 2017-07-07
Attending: EMERGENCY MEDICINE | Admitting: EMERGENCY MEDICINE
Payer: MEDICARE

## 2017-07-07 VITALS
RESPIRATION RATE: 20 BRPM | SYSTOLIC BLOOD PRESSURE: 154 MMHG | OXYGEN SATURATION: 98 % | HEART RATE: 92 BPM | HEIGHT: 64 IN | WEIGHT: 106.04 LBS | DIASTOLIC BLOOD PRESSURE: 81 MMHG | TEMPERATURE: 97 F

## 2017-07-07 DIAGNOSIS — Z98.89 OTHER SPECIFIED POSTPROCEDURAL STATES: Chronic | ICD-10-CM

## 2017-07-07 DIAGNOSIS — R26.2 DIFFICULTY IN WALKING, NOT ELSEWHERE CLASSIFIED: ICD-10-CM

## 2017-07-07 DIAGNOSIS — Z88.0 ALLERGY STATUS TO PENICILLIN: ICD-10-CM

## 2017-07-07 DIAGNOSIS — Z79.899 OTHER LONG TERM (CURRENT) DRUG THERAPY: ICD-10-CM

## 2017-07-07 DIAGNOSIS — Z91.040 LATEX ALLERGY STATUS: ICD-10-CM

## 2017-07-07 DIAGNOSIS — E03.9 HYPOTHYROIDISM, UNSPECIFIED: ICD-10-CM

## 2017-07-07 DIAGNOSIS — G45.9 TRANSIENT CEREBRAL ISCHEMIC ATTACK, UNSPECIFIED: ICD-10-CM

## 2017-07-07 DIAGNOSIS — H54.7 UNSPECIFIED VISUAL LOSS: ICD-10-CM

## 2017-07-07 LAB
ALBUMIN SERPL ELPH-MCNC: 4 G/DL — SIGNIFICANT CHANGE UP (ref 3.3–5)
ALP SERPL-CCNC: 59 U/L — SIGNIFICANT CHANGE UP (ref 40–120)
ALT FLD-CCNC: 11 U/L — SIGNIFICANT CHANGE UP (ref 10–45)
ANION GAP SERPL CALC-SCNC: 12 MMOL/L — SIGNIFICANT CHANGE UP (ref 5–17)
APTT BLD: 31.3 SEC — SIGNIFICANT CHANGE UP (ref 27.5–37.4)
AST SERPL-CCNC: 25 U/L — SIGNIFICANT CHANGE UP (ref 10–40)
BASOPHILS NFR BLD AUTO: 0.7 % — SIGNIFICANT CHANGE UP (ref 0–2)
BILIRUB SERPL-MCNC: 0.4 MG/DL — SIGNIFICANT CHANGE UP (ref 0.2–1.2)
BUN SERPL-MCNC: 13 MG/DL — SIGNIFICANT CHANGE UP (ref 7–23)
CALCIUM SERPL-MCNC: 8.8 MG/DL — SIGNIFICANT CHANGE UP (ref 8.4–10.5)
CHLORIDE SERPL-SCNC: 102 MMOL/L — SIGNIFICANT CHANGE UP (ref 96–108)
CK MB CFR SERPL CALC: 1.7 NG/ML — SIGNIFICANT CHANGE UP (ref 0–6.7)
CO2 SERPL-SCNC: 26 MMOL/L — SIGNIFICANT CHANGE UP (ref 22–31)
CREAT SERPL-MCNC: 0.7 MG/DL — SIGNIFICANT CHANGE UP (ref 0.5–1.3)
EOSINOPHIL NFR BLD AUTO: 0.5 % — SIGNIFICANT CHANGE UP (ref 0–6)
GLUCOSE SERPL-MCNC: 114 MG/DL — HIGH (ref 70–99)
HCT VFR BLD CALC: 40.4 % — SIGNIFICANT CHANGE UP (ref 34.5–45)
HGB BLD-MCNC: 13.5 G/DL — SIGNIFICANT CHANGE UP (ref 11.5–15.5)
INR BLD: 1.17 — HIGH (ref 0.88–1.16)
LYMPHOCYTES # BLD AUTO: 27.3 % — SIGNIFICANT CHANGE UP (ref 13–44)
MCHC RBC-ENTMCNC: 29.6 PG — SIGNIFICANT CHANGE UP (ref 27–34)
MCHC RBC-ENTMCNC: 33.4 G/DL — SIGNIFICANT CHANGE UP (ref 32–36)
MCV RBC AUTO: 88.6 FL — SIGNIFICANT CHANGE UP (ref 80–100)
MONOCYTES NFR BLD AUTO: 7.6 % — SIGNIFICANT CHANGE UP (ref 2–14)
NEUTROPHILS NFR BLD AUTO: 63.9 % — SIGNIFICANT CHANGE UP (ref 43–77)
PLATELET # BLD AUTO: 53 K/UL — LOW (ref 150–400)
POTASSIUM SERPL-MCNC: 4.6 MMOL/L — SIGNIFICANT CHANGE UP (ref 3.5–5.3)
POTASSIUM SERPL-SCNC: 4.6 MMOL/L — SIGNIFICANT CHANGE UP (ref 3.5–5.3)
PROT SERPL-MCNC: 7.1 G/DL — SIGNIFICANT CHANGE UP (ref 6–8.3)
PROTHROM AB SERPL-ACNC: 13 SEC — HIGH (ref 9.8–12.7)
RBC # BLD: 4.56 M/UL — SIGNIFICANT CHANGE UP (ref 3.8–5.2)
RBC # FLD: 14.4 % — SIGNIFICANT CHANGE UP (ref 10.3–16.9)
SODIUM SERPL-SCNC: 140 MMOL/L — SIGNIFICANT CHANGE UP (ref 135–145)
TROPONIN T SERPL-MCNC: <0.01 NG/ML — SIGNIFICANT CHANGE UP (ref 0–0.01)
WBC # BLD: 5.7 K/UL — SIGNIFICANT CHANGE UP (ref 3.8–10.5)
WBC # FLD AUTO: 5.7 K/UL — SIGNIFICANT CHANGE UP (ref 3.8–10.5)

## 2017-07-07 PROCEDURE — 99284 EMERGENCY DEPT VISIT MOD MDM: CPT | Mod: 25

## 2017-07-07 PROCEDURE — 83036 HEMOGLOBIN GLYCOSYLATED A1C: CPT

## 2017-07-07 PROCEDURE — 80053 COMPREHEN METABOLIC PANEL: CPT

## 2017-07-07 PROCEDURE — 85025 COMPLETE CBC W/AUTO DIFF WBC: CPT

## 2017-07-07 PROCEDURE — 82550 ASSAY OF CK (CPK): CPT

## 2017-07-07 PROCEDURE — 36415 COLL VENOUS BLD VENIPUNCTURE: CPT

## 2017-07-07 PROCEDURE — 84484 ASSAY OF TROPONIN QUANT: CPT

## 2017-07-07 PROCEDURE — 85610 PROTHROMBIN TIME: CPT

## 2017-07-07 PROCEDURE — 70496 CT ANGIOGRAPHY HEAD: CPT

## 2017-07-07 PROCEDURE — 99285 EMERGENCY DEPT VISIT HI MDM: CPT | Mod: 25

## 2017-07-07 PROCEDURE — 80061 LIPID PANEL: CPT

## 2017-07-07 PROCEDURE — 70450 CT HEAD/BRAIN W/O DYE: CPT

## 2017-07-07 PROCEDURE — 93010 ELECTROCARDIOGRAM REPORT: CPT

## 2017-07-07 PROCEDURE — 70450 CT HEAD/BRAIN W/O DYE: CPT | Mod: 26,59

## 2017-07-07 PROCEDURE — 70498 CT ANGIOGRAPHY NECK: CPT

## 2017-07-07 PROCEDURE — 93005 ELECTROCARDIOGRAM TRACING: CPT

## 2017-07-07 PROCEDURE — 70496 CT ANGIOGRAPHY HEAD: CPT | Mod: 26

## 2017-07-07 PROCEDURE — 82553 CREATINE MB FRACTION: CPT

## 2017-07-07 PROCEDURE — 85730 THROMBOPLASTIN TIME PARTIAL: CPT

## 2017-07-07 PROCEDURE — 70498 CT ANGIOGRAPHY NECK: CPT | Mod: 26

## 2017-07-07 RX ORDER — ATORVASTATIN CALCIUM 80 MG/1
1 TABLET, FILM COATED ORAL
Qty: 14 | Refills: 0 | OUTPATIENT
Start: 2017-07-07 | End: 2017-07-21

## 2017-07-07 RX ORDER — ATORVASTATIN CALCIUM 80 MG/1
80 TABLET, FILM COATED ORAL ONCE
Qty: 0 | Refills: 0 | Status: COMPLETED | OUTPATIENT
Start: 2017-07-07 | End: 2017-07-07

## 2017-07-07 RX ADMIN — ATORVASTATIN CALCIUM 80 MILLIGRAM(S): 80 TABLET, FILM COATED ORAL at 20:40

## 2017-07-07 NOTE — ED PROVIDER NOTE - SHIFT CHANGE DETAILS
after ct angio results call DR Bolden.  if normal patient may be able to go home and fu with her in office.  had a mri a few months ago that was normal

## 2017-07-07 NOTE — ED PROVIDER NOTE - MEDICAL DECISION MAKING DETAILS
85 yo female with brief episode of vision loss in right eye last night, resolved after a few minutes.  will get ct head, labs, call dr vaca

## 2017-07-07 NOTE — ED ADULT NURSE NOTE - OBJECTIVE STATEMENT
Patient arrived to ED via walk-in stating, "Last night, around 10PM, the vision in my right eye got blurry.  It lasted about one minute."  Patient is A&Ox3, in NAD, complaining of brief episode of right eye blurriness which has resolved.  Patient denies any chest pain, SOB, nausea, vomiting, diarrhea, fevers, chills or cough.  No facial droop, slurred speech or CMS deficits noted.  PMH of Vertigo, Afib, MVP.  PIV placed, labs drawn and sent, EKG done.  Will continue with plan of care.

## 2017-07-07 NOTE — ED ADULT NURSE REASSESSMENT NOTE - NS ED NURSE REASSESS COMMENT FT1
recvd pt awake on bed, a&o x 3, ambulatory, awaiting final CT report. offered blanket as comfort measure. will ff up.

## 2017-07-07 NOTE — ED PROVIDER NOTE - OBJECTIVE STATEMENT
episode of loss of vision in right eye upper part of vision last night lasted about a minute and then resolved.

## 2017-07-07 NOTE — ED ADULT TRIAGE NOTE - CHIEF COMPLAINT QUOTE
Patient was sent by PMD for evaluation r/o  stroke  , had blurry vision while watching tv at 10pm last night , went to ENT   this morning . Also with unsteady gait at 9am today .

## 2017-07-07 NOTE — ED PROVIDER NOTE - PROGRESS NOTE DETAILS
s/o from Dr. Le, pending CTA head/neck to evaluate for vascular pathology, will reach out to Dr. Bolden after d/w Dr. Bolden, CTA no acute vascular pathology, pt had neg MRI and echo done, will see pt in office next week, currently on eliquis, will add lipitor, will add on lipid profile and hemoglobin a1c, instructed pt to return for any new or change in sx. spoke w/ pt, aware of medications and follow up instructions

## 2017-07-07 NOTE — ED PROVIDER NOTE - NOTES
patient had mri recently that was normal.  [facility] ct head and neck

## 2017-07-09 ENCOUNTER — TRANSCRIPTION ENCOUNTER (OUTPATIENT)
Age: 82
End: 2017-07-09

## 2017-07-12 ENCOUNTER — APPOINTMENT (OUTPATIENT)
Dept: NEUROLOGY | Facility: CLINIC | Age: 82
End: 2017-07-12

## 2017-07-12 VITALS
DIASTOLIC BLOOD PRESSURE: 91 MMHG | TEMPERATURE: 98.2 F | HEART RATE: 91 BPM | WEIGHT: 108 LBS | OXYGEN SATURATION: 98 % | HEIGHT: 64 IN | SYSTOLIC BLOOD PRESSURE: 146 MMHG | BODY MASS INDEX: 18.44 KG/M2

## 2017-07-12 DIAGNOSIS — R42 DIZZINESS AND GIDDINESS: ICD-10-CM

## 2017-07-12 DIAGNOSIS — Z87.39 PERSONAL HISTORY OF OTHER DISEASES OF THE MUSCULOSKELETAL SYSTEM AND CONNECTIVE TISSUE: ICD-10-CM

## 2017-07-12 DIAGNOSIS — Z86.69 PERSONAL HISTORY OF OTHER DISEASES OF THE NERVOUS SYSTEM AND SENSE ORGANS: ICD-10-CM

## 2017-07-19 ENCOUNTER — FORM ENCOUNTER (OUTPATIENT)
Age: 82
End: 2017-07-19

## 2017-07-20 ENCOUNTER — APPOINTMENT (OUTPATIENT)
Dept: NEUROSURGERY | Facility: CLINIC | Age: 82
End: 2017-07-20

## 2017-07-20 ENCOUNTER — OUTPATIENT (OUTPATIENT)
Dept: OUTPATIENT SERVICES | Facility: HOSPITAL | Age: 82
LOS: 1 days | End: 2017-07-20
Payer: MEDICARE

## 2017-07-20 VITALS
OXYGEN SATURATION: 97 % | DIASTOLIC BLOOD PRESSURE: 72 MMHG | SYSTOLIC BLOOD PRESSURE: 133 MMHG | HEART RATE: 76 BPM | WEIGHT: 108 LBS | BODY MASS INDEX: 18.44 KG/M2 | HEIGHT: 64 IN

## 2017-07-20 DIAGNOSIS — Z98.89 OTHER SPECIFIED POSTPROCEDURAL STATES: Chronic | ICD-10-CM

## 2017-07-20 PROCEDURE — 72080 X-RAY EXAM THORACOLMB 2/> VW: CPT

## 2017-07-20 PROCEDURE — 72080 X-RAY EXAM THORACOLMB 2/> VW: CPT | Mod: 26

## 2017-09-04 PROBLEM — Z87.39 HISTORY OF ARTHRITIS: Status: RESOLVED | Noted: 2017-06-22 | Resolved: 2017-09-04

## 2017-09-04 PROBLEM — Z86.69 HISTORY OF MIGRAINE HEADACHES: Status: RESOLVED | Noted: 2017-06-22 | Resolved: 2017-09-04

## 2017-09-04 PROBLEM — R42 LIGHTHEADEDNESS: Status: RESOLVED | Noted: 2017-06-22 | Resolved: 2017-09-04

## 2017-09-25 ENCOUNTER — APPOINTMENT (OUTPATIENT)
Dept: NEUROLOGY | Facility: CLINIC | Age: 82
End: 2017-09-25
Payer: MEDICARE

## 2017-09-25 VITALS
DIASTOLIC BLOOD PRESSURE: 75 MMHG | BODY MASS INDEX: 18.44 KG/M2 | WEIGHT: 108 LBS | HEIGHT: 64 IN | OXYGEN SATURATION: 99 % | SYSTOLIC BLOOD PRESSURE: 114 MMHG

## 2017-09-25 PROCEDURE — 99214 OFFICE O/P EST MOD 30 MIN: CPT

## 2017-09-25 RX ORDER — ATORVASTATIN CALCIUM 80 MG/1
80 TABLET, FILM COATED ORAL DAILY
Refills: 0 | Status: DISCONTINUED | COMMUNITY
End: 2017-09-25

## 2017-09-25 RX ORDER — MECLIZINE HYDROCHLORIDE 12.5 MG/1
12.5 TABLET ORAL
Refills: 0 | Status: DISCONTINUED | COMMUNITY
End: 2017-09-25

## 2017-09-27 NOTE — PHYSICAL THERAPY INITIAL EVALUATION ADULT - LEVEL OF INDEPENDENCE: SIT/SUPINE, REHAB EVAL
POST MECHANICAL FALL  FOR IM NAILING TOMORROW WITH DR HARPER  NEEDS CARDIO CLEARANCE.    KEEP NPO POST MN TONIGHT  DECREASE INSULIN DOSING TO HALF  WOULD HOLD DIURETIC WITH ELEVATED CR LEVEL independent

## 2017-10-20 ENCOUNTER — EMERGENCY (EMERGENCY)
Facility: HOSPITAL | Age: 82
LOS: 1 days | Discharge: HOME | End: 2017-10-20
Attending: EMERGENCY MEDICINE | Admitting: EMERGENCY MEDICINE
Payer: MEDICARE

## 2017-10-20 VITALS
DIASTOLIC BLOOD PRESSURE: 59 MMHG | WEIGHT: 110.01 LBS | SYSTOLIC BLOOD PRESSURE: 134 MMHG | HEART RATE: 119 BPM | TEMPERATURE: 98 F | RESPIRATION RATE: 18 BRPM | HEIGHT: 63 IN | OXYGEN SATURATION: 98 %

## 2017-10-20 VITALS
OXYGEN SATURATION: 98 % | RESPIRATION RATE: 17 BRPM | DIASTOLIC BLOOD PRESSURE: 62 MMHG | TEMPERATURE: 98 F | HEART RATE: 98 BPM | SYSTOLIC BLOOD PRESSURE: 131 MMHG

## 2017-10-20 DIAGNOSIS — I48.91 UNSPECIFIED ATRIAL FIBRILLATION: ICD-10-CM

## 2017-10-20 DIAGNOSIS — Z91.018 ALLERGY TO OTHER FOODS: ICD-10-CM

## 2017-10-20 DIAGNOSIS — R07.89 OTHER CHEST PAIN: ICD-10-CM

## 2017-10-20 DIAGNOSIS — Z98.89 OTHER SPECIFIED POSTPROCEDURAL STATES: Chronic | ICD-10-CM

## 2017-10-20 DIAGNOSIS — Z91.041 RADIOGRAPHIC DYE ALLERGY STATUS: ICD-10-CM

## 2017-10-20 DIAGNOSIS — E03.9 HYPOTHYROIDISM, UNSPECIFIED: ICD-10-CM

## 2017-10-20 DIAGNOSIS — Z79.899 OTHER LONG TERM (CURRENT) DRUG THERAPY: ICD-10-CM

## 2017-10-20 DIAGNOSIS — Z88.0 ALLERGY STATUS TO PENICILLIN: ICD-10-CM

## 2017-10-20 LAB
ALBUMIN SERPL ELPH-MCNC: 4.5 G/DL — SIGNIFICANT CHANGE UP (ref 3.3–5)
ALP SERPL-CCNC: 84 U/L — SIGNIFICANT CHANGE UP (ref 40–120)
ALT FLD-CCNC: 25 U/L — SIGNIFICANT CHANGE UP (ref 10–45)
ANION GAP SERPL CALC-SCNC: 13 MMOL/L — SIGNIFICANT CHANGE UP (ref 5–17)
APTT BLD: 32.9 SEC — SIGNIFICANT CHANGE UP (ref 27.5–37.4)
AST SERPL-CCNC: 41 U/L — HIGH (ref 10–40)
BASOPHILS NFR BLD AUTO: 0.5 % — SIGNIFICANT CHANGE UP (ref 0–2)
BILIRUB SERPL-MCNC: 0.5 MG/DL — SIGNIFICANT CHANGE UP (ref 0.2–1.2)
BUN SERPL-MCNC: 17 MG/DL — SIGNIFICANT CHANGE UP (ref 7–23)
CALCIUM SERPL-MCNC: 9.5 MG/DL — SIGNIFICANT CHANGE UP (ref 8.4–10.5)
CHLORIDE SERPL-SCNC: 101 MMOL/L — SIGNIFICANT CHANGE UP (ref 96–108)
CK MB CFR SERPL CALC: 2.4 NG/ML — SIGNIFICANT CHANGE UP (ref 0–6.7)
CK SERPL-CCNC: 55 U/L — SIGNIFICANT CHANGE UP (ref 25–170)
CO2 SERPL-SCNC: 27 MMOL/L — SIGNIFICANT CHANGE UP (ref 22–31)
CREAT SERPL-MCNC: 0.93 MG/DL — SIGNIFICANT CHANGE UP (ref 0.5–1.3)
EOSINOPHIL NFR BLD AUTO: 1.4 % — SIGNIFICANT CHANGE UP (ref 0–6)
GLUCOSE SERPL-MCNC: 129 MG/DL — HIGH (ref 70–99)
HCT VFR BLD CALC: 40.4 % — SIGNIFICANT CHANGE UP (ref 34.5–45)
HGB BLD-MCNC: 13.4 G/DL — SIGNIFICANT CHANGE UP (ref 11.5–15.5)
INR BLD: 1.19 — HIGH (ref 0.88–1.16)
LYMPHOCYTES # BLD AUTO: 24.4 % — SIGNIFICANT CHANGE UP (ref 13–44)
MCHC RBC-ENTMCNC: 29.5 PG — SIGNIFICANT CHANGE UP (ref 27–34)
MCHC RBC-ENTMCNC: 33.2 G/DL — SIGNIFICANT CHANGE UP (ref 32–36)
MCV RBC AUTO: 89 FL — SIGNIFICANT CHANGE UP (ref 80–100)
MONOCYTES NFR BLD AUTO: 8.1 % — SIGNIFICANT CHANGE UP (ref 2–14)
NEUTROPHILS NFR BLD AUTO: 65.6 % — SIGNIFICANT CHANGE UP (ref 43–77)
PLATELET # BLD AUTO: 84 K/UL — LOW (ref 150–400)
POTASSIUM SERPL-MCNC: 4.6 MMOL/L — SIGNIFICANT CHANGE UP (ref 3.5–5.3)
POTASSIUM SERPL-SCNC: 4.6 MMOL/L — SIGNIFICANT CHANGE UP (ref 3.5–5.3)
PROT SERPL-MCNC: 7.6 G/DL — SIGNIFICANT CHANGE UP (ref 6–8.3)
PROTHROM AB SERPL-ACNC: 13.2 SEC — HIGH (ref 9.8–12.7)
RBC # BLD: 4.54 M/UL — SIGNIFICANT CHANGE UP (ref 3.8–5.2)
RBC # FLD: 14.1 % — SIGNIFICANT CHANGE UP (ref 10.3–16.9)
SODIUM SERPL-SCNC: 141 MMOL/L — SIGNIFICANT CHANGE UP (ref 135–145)
TROPONIN T SERPL-MCNC: <0.01 NG/ML — SIGNIFICANT CHANGE UP (ref 0–0.01)
WBC # BLD: 5.8 K/UL — SIGNIFICANT CHANGE UP (ref 3.8–10.5)
WBC # FLD AUTO: 5.8 K/UL — SIGNIFICANT CHANGE UP (ref 3.8–10.5)

## 2017-10-20 PROCEDURE — 99284 EMERGENCY DEPT VISIT MOD MDM: CPT | Mod: 25

## 2017-10-20 PROCEDURE — 82550 ASSAY OF CK (CPK): CPT

## 2017-10-20 PROCEDURE — 93005 ELECTROCARDIOGRAM TRACING: CPT

## 2017-10-20 PROCEDURE — 96374 THER/PROPH/DIAG INJ IV PUSH: CPT

## 2017-10-20 PROCEDURE — 80053 COMPREHEN METABOLIC PANEL: CPT

## 2017-10-20 PROCEDURE — 93010 ELECTROCARDIOGRAM REPORT: CPT | Mod: 77

## 2017-10-20 PROCEDURE — 99285 EMERGENCY DEPT VISIT HI MDM: CPT | Mod: 25

## 2017-10-20 PROCEDURE — 71010: CPT | Mod: 26

## 2017-10-20 PROCEDURE — 71045 X-RAY EXAM CHEST 1 VIEW: CPT

## 2017-10-20 PROCEDURE — 85025 COMPLETE CBC W/AUTO DIFF WBC: CPT

## 2017-10-20 PROCEDURE — 84484 ASSAY OF TROPONIN QUANT: CPT

## 2017-10-20 PROCEDURE — 85730 THROMBOPLASTIN TIME PARTIAL: CPT

## 2017-10-20 PROCEDURE — 85610 PROTHROMBIN TIME: CPT

## 2017-10-20 PROCEDURE — 93010 ELECTROCARDIOGRAM REPORT: CPT

## 2017-10-20 PROCEDURE — 82553 CREATINE MB FRACTION: CPT

## 2017-10-20 RX ORDER — DILTIAZEM HCL 120 MG
1 CAPSULE, EXT RELEASE 24 HR ORAL
Qty: 42 | Refills: 0 | OUTPATIENT
Start: 2017-10-20 | End: 2017-11-03

## 2017-10-20 RX ORDER — SODIUM CHLORIDE 9 MG/ML
1000 INJECTION INTRAMUSCULAR; INTRAVENOUS; SUBCUTANEOUS
Qty: 0 | Refills: 0 | Status: DISCONTINUED | OUTPATIENT
Start: 2017-10-20 | End: 2017-10-24

## 2017-10-20 RX ADMIN — SODIUM CHLORIDE 125 MILLILITER(S): 9 INJECTION INTRAMUSCULAR; INTRAVENOUS; SUBCUTANEOUS at 17:55

## 2017-10-20 NOTE — ED PROVIDER NOTE - OBJECTIVE STATEMENT
85 yo F with hx of PAF on Elloquis- ocular migraines prior ablation for afib -1 year ago- no meds for rate control currently-  c/o of cp x 2 days-  occasional palpitations- no sob -no N/V-  has chronic LLE swelling -no hx of DVT or PE-  non smoker - no DM 87 yo F with hx of PAF on Elloquis- ocular migraines prior ablation for afib -1 year ago- no meds for rate control currently-  c/o of cp x 2 days-  occasional palpitations- no sob -no N/V-  has chronic LLE swelling -no hx of DVT or PE-  non smoker - no DM no prior hx of CAD or known MI

## 2017-10-20 NOTE — ED ADULT NURSE NOTE - OBJECTIVE STATEMENT
Pt came to ED complaining of intermittent chest pain and palpitations for two days. Pt reports pressure pain, denies D/N/V, abd pain, /GI symptoms. Pt is alert and oriented x3. Positive PMS x4 extremities. Lung sounds clear bilaterally.  Skin is warm, dry, unremarkable.

## 2017-10-20 NOTE — ED PROVIDER NOTE - MEDICAL DECISION MAKING DETAILS
afib - rate 130s-  now 90s on cardizem  stable - await trop  VSS afib - rate 130s-  now 90s on cardizem  stable - await trop  VSS seen by dr cassandra hernandez dc on cardizem  mg a day  no known hx of CAD

## 2017-10-20 NOTE — ED ADULT NURSE NOTE - OTHER COMPLAINTS
Pt presented to ED with chest discomfort, palpitation and generalized weakness. Hx of a fib, ablation about 8 month ago. Pt denies N+V, dizziness, SOB, fever. EKG in progress.

## 2017-10-20 NOTE — ED ADULT TRIAGE NOTE - OTHER COMPLAINTS
Pt denies N+V, dizziness, SOB, fever. EKG in progress. Hx of a fib, ablation about 8 month ago. Pt denies N+V, dizziness, SOB, fever. EKG in progress. Pt presented to ED with chest discomfort, palpitation and generalized weakness. Hx of a fib, ablation about 8 month ago. Pt denies N+V, dizziness, SOB, fever. EKG in progress.

## 2017-10-20 NOTE — ED ADULT TRIAGE NOTE - CHIEF COMPLAINT QUOTE
"I have a fib. I've been having hest pain for a few days. I think I'm having a fib now." "I have a fib. I've been having chest pain for a few days. I think I'm having a fib now."

## 2017-10-20 NOTE — ED ADULT NURSE NOTE - CHPI ED SYMPTOMS NEG
no nausea/no chills/no dizziness/no syncope/no diaphoresis/no vomiting/no fever/no cough/no shortness of breath/no back pain

## 2017-10-23 ENCOUNTER — OUTPATIENT (OUTPATIENT)
Dept: OUTPATIENT SERVICES | Facility: HOSPITAL | Age: 82
LOS: 1 days | Discharge: HOME | End: 2017-10-23
Payer: COMMERCIAL

## 2017-10-23 ENCOUNTER — APPOINTMENT (OUTPATIENT)
Dept: HEART AND VASCULAR | Facility: CLINIC | Age: 82
End: 2017-10-23
Payer: MEDICARE

## 2017-10-23 DIAGNOSIS — Z98.89 OTHER SPECIFIED POSTPROCEDURAL STATES: Chronic | ICD-10-CM

## 2017-10-23 PROCEDURE — 92960 CARDIOVERSION ELECTRIC EXT: CPT

## 2017-10-23 PROCEDURE — 99215 OFFICE O/P EST HI 40 MIN: CPT

## 2017-10-23 PROCEDURE — 93000 ELECTROCARDIOGRAM COMPLETE: CPT

## 2017-10-23 RX ORDER — SACCHAROMYCES BOULARDII 50 MG
250 CAPSULE ORAL
Refills: 0 | Status: DISCONTINUED | COMMUNITY
End: 2017-10-23

## 2017-10-23 RX ORDER — ESTROGENS, CONJUGATED 0.3 MG/1
0.3 TABLET, FILM COATED ORAL DAILY
Refills: 0 | Status: DISCONTINUED | COMMUNITY
End: 2017-10-23

## 2017-10-24 ENCOUNTER — MOBILE ON CALL (OUTPATIENT)
Age: 82
End: 2017-10-24

## 2017-10-26 ENCOUNTER — APPOINTMENT (OUTPATIENT)
Dept: HEART AND VASCULAR | Facility: CLINIC | Age: 82
End: 2017-10-26
Payer: MEDICARE

## 2017-10-26 VITALS
DIASTOLIC BLOOD PRESSURE: 55 MMHG | BODY MASS INDEX: 19.81 KG/M2 | HEIGHT: 64 IN | SYSTOLIC BLOOD PRESSURE: 112 MMHG | WEIGHT: 116 LBS | HEART RATE: 95 BPM

## 2017-10-26 PROCEDURE — 93000 ELECTROCARDIOGRAM COMPLETE: CPT

## 2017-10-26 PROCEDURE — 99215 OFFICE O/P EST HI 40 MIN: CPT

## 2017-10-30 DIAGNOSIS — I48.0 PAROXYSMAL ATRIAL FIBRILLATION: ICD-10-CM

## 2017-11-02 ENCOUNTER — APPOINTMENT (OUTPATIENT)
Dept: HEART AND VASCULAR | Facility: CLINIC | Age: 82
End: 2017-11-02
Payer: MEDICARE

## 2017-11-02 VITALS
SYSTOLIC BLOOD PRESSURE: 96 MMHG | HEIGHT: 64 IN | DIASTOLIC BLOOD PRESSURE: 45 MMHG | BODY MASS INDEX: 19.81 KG/M2 | HEART RATE: 59 BPM | WEIGHT: 116 LBS

## 2017-11-02 VITALS — SYSTOLIC BLOOD PRESSURE: 110 MMHG | DIASTOLIC BLOOD PRESSURE: 64 MMHG

## 2017-11-02 PROCEDURE — 93000 ELECTROCARDIOGRAM COMPLETE: CPT

## 2017-11-02 PROCEDURE — 99215 OFFICE O/P EST HI 40 MIN: CPT

## 2017-11-02 RX ORDER — AMIODARONE HYDROCHLORIDE 200 MG/1
200 TABLET ORAL TWICE DAILY
Qty: 40 | Refills: 0 | Status: DISCONTINUED | COMMUNITY
Start: 2017-10-24 | End: 2017-11-02

## 2017-11-09 ENCOUNTER — APPOINTMENT (OUTPATIENT)
Dept: HEART AND VASCULAR | Facility: CLINIC | Age: 82
End: 2017-11-09
Payer: MEDICARE

## 2017-11-09 VITALS
HEART RATE: 75 BPM | DIASTOLIC BLOOD PRESSURE: 60 MMHG | WEIGHT: 112 LBS | HEIGHT: 64 IN | BODY MASS INDEX: 19.12 KG/M2 | SYSTOLIC BLOOD PRESSURE: 130 MMHG

## 2017-11-09 PROCEDURE — 93000 ELECTROCARDIOGRAM COMPLETE: CPT

## 2017-11-09 PROCEDURE — 99215 OFFICE O/P EST HI 40 MIN: CPT

## 2017-11-28 ENCOUNTER — APPOINTMENT (OUTPATIENT)
Dept: NEUROLOGY | Facility: CLINIC | Age: 82
End: 2017-11-28
Payer: MEDICARE

## 2017-11-28 VITALS
OXYGEN SATURATION: 99 % | DIASTOLIC BLOOD PRESSURE: 75 MMHG | WEIGHT: 112 LBS | SYSTOLIC BLOOD PRESSURE: 145 MMHG | BODY MASS INDEX: 19.12 KG/M2 | HEIGHT: 64 IN | HEART RATE: 78 BPM

## 2017-11-28 DIAGNOSIS — R25.1 TREMOR, UNSPECIFIED: ICD-10-CM

## 2017-11-28 DIAGNOSIS — Z82.0 FAMILY HISTORY OF EPILEPSY AND OTHER DISEASES OF THE NERVOUS SYSTEM: ICD-10-CM

## 2017-11-28 PROCEDURE — 99215 OFFICE O/P EST HI 40 MIN: CPT

## 2017-12-14 ENCOUNTER — APPOINTMENT (OUTPATIENT)
Dept: HEART AND VASCULAR | Facility: CLINIC | Age: 82
End: 2017-12-14
Payer: MEDICARE

## 2017-12-14 VITALS
HEART RATE: 73 BPM | HEIGHT: 64 IN | SYSTOLIC BLOOD PRESSURE: 123 MMHG | WEIGHT: 112 LBS | DIASTOLIC BLOOD PRESSURE: 58 MMHG | BODY MASS INDEX: 19.12 KG/M2

## 2017-12-14 PROCEDURE — 93000 ELECTROCARDIOGRAM COMPLETE: CPT

## 2017-12-14 PROCEDURE — 99215 OFFICE O/P EST HI 40 MIN: CPT

## 2017-12-22 ENCOUNTER — APPOINTMENT (OUTPATIENT)
Dept: HEART AND VASCULAR | Facility: CLINIC | Age: 82
End: 2017-12-22

## 2018-01-11 ENCOUNTER — APPOINTMENT (OUTPATIENT)
Dept: NEUROLOGY | Facility: CLINIC | Age: 83
End: 2018-01-11
Payer: MEDICARE

## 2018-01-11 PROCEDURE — 95816 EEG AWAKE AND DROWSY: CPT

## 2018-02-07 ENCOUNTER — APPOINTMENT (OUTPATIENT)
Dept: NEUROLOGY | Facility: CLINIC | Age: 83
End: 2018-02-07
Payer: MEDICARE

## 2018-02-07 VITALS
HEIGHT: 64 IN | HEART RATE: 88 BPM | BODY MASS INDEX: 19.12 KG/M2 | SYSTOLIC BLOOD PRESSURE: 116 MMHG | DIASTOLIC BLOOD PRESSURE: 75 MMHG | WEIGHT: 112 LBS | OXYGEN SATURATION: 100 %

## 2018-02-07 PROCEDURE — 99214 OFFICE O/P EST MOD 30 MIN: CPT

## 2018-02-08 ENCOUNTER — FORM ENCOUNTER (OUTPATIENT)
Age: 83
End: 2018-02-08

## 2018-02-09 ENCOUNTER — OUTPATIENT (OUTPATIENT)
Dept: OUTPATIENT SERVICES | Facility: HOSPITAL | Age: 83
LOS: 1 days | End: 2018-02-09
Payer: MEDICARE

## 2018-02-09 ENCOUNTER — APPOINTMENT (OUTPATIENT)
Dept: NEUROSURGERY | Facility: CLINIC | Age: 83
End: 2018-02-09
Payer: MEDICARE

## 2018-02-09 VITALS
OXYGEN SATURATION: 99 % | HEART RATE: 97 BPM | HEIGHT: 64 IN | SYSTOLIC BLOOD PRESSURE: 155 MMHG | DIASTOLIC BLOOD PRESSURE: 83 MMHG

## 2018-02-09 DIAGNOSIS — Z98.89 OTHER SPECIFIED POSTPROCEDURAL STATES: Chronic | ICD-10-CM

## 2018-02-09 PROCEDURE — 72080 X-RAY EXAM THORACOLMB 2/> VW: CPT | Mod: 26

## 2018-02-09 PROCEDURE — 72080 X-RAY EXAM THORACOLMB 2/> VW: CPT

## 2018-02-09 PROCEDURE — 99214 OFFICE O/P EST MOD 30 MIN: CPT

## 2018-03-15 ENCOUNTER — APPOINTMENT (OUTPATIENT)
Dept: HEART AND VASCULAR | Facility: CLINIC | Age: 83
End: 2018-03-15
Payer: MEDICARE

## 2018-03-15 VITALS
WEIGHT: 112 LBS | BODY MASS INDEX: 19.12 KG/M2 | SYSTOLIC BLOOD PRESSURE: 134 MMHG | HEART RATE: 79 BPM | HEIGHT: 64 IN | DIASTOLIC BLOOD PRESSURE: 64 MMHG

## 2018-03-15 PROCEDURE — 93000 ELECTROCARDIOGRAM COMPLETE: CPT

## 2018-03-15 PROCEDURE — 99215 OFFICE O/P EST HI 40 MIN: CPT | Mod: 25

## 2018-03-16 ENCOUNTER — FORM ENCOUNTER (OUTPATIENT)
Age: 83
End: 2018-03-16

## 2018-03-17 ENCOUNTER — APPOINTMENT (OUTPATIENT)
Dept: MRI IMAGING | Facility: HOSPITAL | Age: 83
End: 2018-03-17
Payer: MEDICARE

## 2018-03-17 ENCOUNTER — OUTPATIENT (OUTPATIENT)
Dept: OUTPATIENT SERVICES | Facility: HOSPITAL | Age: 83
LOS: 1 days | End: 2018-03-17
Payer: MEDICARE

## 2018-03-17 DIAGNOSIS — Z98.89 OTHER SPECIFIED POSTPROCEDURAL STATES: Chronic | ICD-10-CM

## 2018-03-17 PROCEDURE — 72146 MRI CHEST SPINE W/O DYE: CPT | Mod: 26

## 2018-03-17 PROCEDURE — 72146 MRI CHEST SPINE W/O DYE: CPT

## 2018-03-28 ENCOUNTER — APPOINTMENT (OUTPATIENT)
Dept: ORTHOPEDIC SURGERY | Facility: CLINIC | Age: 83
End: 2018-03-28
Payer: MEDICARE

## 2018-03-28 VITALS
WEIGHT: 110 LBS | SYSTOLIC BLOOD PRESSURE: 120 MMHG | BODY MASS INDEX: 19.49 KG/M2 | DIASTOLIC BLOOD PRESSURE: 60 MMHG | HEIGHT: 63 IN

## 2018-03-28 DIAGNOSIS — M43.9 DEFORMING DORSOPATHY, UNSPECIFIED: ICD-10-CM

## 2018-03-28 DIAGNOSIS — M54.5 LOW BACK PAIN: ICD-10-CM

## 2018-03-28 DIAGNOSIS — G89.29 LOW BACK PAIN: ICD-10-CM

## 2018-03-28 PROCEDURE — 99214 OFFICE O/P EST MOD 30 MIN: CPT

## 2018-04-11 ENCOUNTER — APPOINTMENT (OUTPATIENT)
Dept: NEUROLOGY | Facility: CLINIC | Age: 83
End: 2018-04-11

## 2018-05-02 ENCOUNTER — APPOINTMENT (OUTPATIENT)
Dept: ORTHOPEDIC SURGERY | Facility: CLINIC | Age: 83
End: 2018-05-02
Payer: MEDICARE

## 2018-05-02 VITALS — WEIGHT: 110 LBS | BODY MASS INDEX: 19.49 KG/M2 | HEIGHT: 63 IN

## 2018-05-02 DIAGNOSIS — S22.089A UNSPECIFIED FRACTURE OF T11-T12 VERTEBRA, INITIAL ENCOUNTER FOR CLOSED FRACTURE: ICD-10-CM

## 2018-05-02 PROCEDURE — 99213 OFFICE O/P EST LOW 20 MIN: CPT

## 2018-06-04 ENCOUNTER — APPOINTMENT (OUTPATIENT)
Dept: NEUROLOGY | Facility: CLINIC | Age: 83
End: 2018-06-04
Payer: MEDICARE

## 2018-06-04 VITALS
DIASTOLIC BLOOD PRESSURE: 84 MMHG | TEMPERATURE: 98.1 F | WEIGHT: 109.04 LBS | SYSTOLIC BLOOD PRESSURE: 151 MMHG | OXYGEN SATURATION: 99 % | BODY MASS INDEX: 19.32 KG/M2 | HEART RATE: 92 BPM | HEIGHT: 63 IN

## 2018-06-04 VITALS — HEART RATE: 91 BPM | DIASTOLIC BLOOD PRESSURE: 84 MMHG | SYSTOLIC BLOOD PRESSURE: 154 MMHG

## 2018-06-04 DIAGNOSIS — Z82.62 FAMILY HISTORY OF OSTEOPOROSIS: ICD-10-CM

## 2018-06-04 DIAGNOSIS — Z82.61 FAMILY HISTORY OF ARTHRITIS: ICD-10-CM

## 2018-06-04 DIAGNOSIS — Z80.9 FAMILY HISTORY OF MALIGNANT NEOPLASM, UNSPECIFIED: ICD-10-CM

## 2018-06-04 DIAGNOSIS — G47.9 SLEEP DISORDER, UNSPECIFIED: ICD-10-CM

## 2018-06-04 PROCEDURE — 99214 OFFICE O/P EST MOD 30 MIN: CPT

## 2018-06-10 ENCOUNTER — FORM ENCOUNTER (OUTPATIENT)
Age: 83
End: 2018-06-10

## 2018-06-11 ENCOUNTER — APPOINTMENT (OUTPATIENT)
Dept: RADIOLOGY | Facility: HOSPITAL | Age: 83
End: 2018-06-11
Payer: MEDICARE

## 2018-06-11 ENCOUNTER — OUTPATIENT (OUTPATIENT)
Dept: OUTPATIENT SERVICES | Facility: HOSPITAL | Age: 83
LOS: 1 days | End: 2018-06-11
Payer: MEDICARE

## 2018-06-11 DIAGNOSIS — Z98.89 OTHER SPECIFIED POSTPROCEDURAL STATES: Chronic | ICD-10-CM

## 2018-06-11 PROCEDURE — 77080 DXA BONE DENSITY AXIAL: CPT

## 2018-06-11 PROCEDURE — 77081 DXA BONE DENSITY APPENDICULR: CPT

## 2018-06-11 PROCEDURE — 77080 DXA BONE DENSITY AXIAL: CPT | Mod: 26

## 2018-06-14 ENCOUNTER — APPOINTMENT (OUTPATIENT)
Dept: ORTHOPEDIC SURGERY | Facility: CLINIC | Age: 83
End: 2018-06-14
Payer: MEDICARE

## 2018-06-14 ENCOUNTER — APPOINTMENT (OUTPATIENT)
Dept: HEART AND VASCULAR | Facility: CLINIC | Age: 83
End: 2018-06-14
Payer: MEDICARE

## 2018-06-14 VITALS
HEART RATE: 95 BPM | DIASTOLIC BLOOD PRESSURE: 65 MMHG | SYSTOLIC BLOOD PRESSURE: 139 MMHG | BODY MASS INDEX: 19.49 KG/M2 | WEIGHT: 110 LBS | HEIGHT: 63 IN

## 2018-06-14 VITALS — HEIGHT: 63.39 IN | BODY MASS INDEX: 19.25 KG/M2

## 2018-06-14 PROCEDURE — 99215 OFFICE O/P EST HI 40 MIN: CPT | Mod: 25

## 2018-06-14 PROCEDURE — 93000 ELECTROCARDIOGRAM COMPLETE: CPT

## 2018-06-14 PROCEDURE — 99205 OFFICE O/P NEW HI 60 MIN: CPT

## 2018-07-26 ENCOUNTER — APPOINTMENT (OUTPATIENT)
Dept: ORTHOPEDIC SURGERY | Facility: CLINIC | Age: 83
End: 2018-07-26
Payer: MEDICARE

## 2018-07-26 VITALS
BODY MASS INDEX: 19.49 KG/M2 | WEIGHT: 110 LBS | OXYGEN SATURATION: 98 % | HEART RATE: 86 BPM | DIASTOLIC BLOOD PRESSURE: 71 MMHG | SYSTOLIC BLOOD PRESSURE: 140 MMHG | HEIGHT: 62.99 IN

## 2018-07-26 DIAGNOSIS — E03.9 HYPOTHYROIDISM, UNSPECIFIED: ICD-10-CM

## 2018-07-26 PROCEDURE — 99214 OFFICE O/P EST MOD 30 MIN: CPT

## 2018-08-02 ENCOUNTER — APPOINTMENT (OUTPATIENT)
Dept: OTOLARYNGOLOGY | Facility: CLINIC | Age: 83
End: 2018-08-02
Payer: MEDICARE

## 2018-08-02 VITALS
HEART RATE: 90 BPM | OXYGEN SATURATION: 100 % | TEMPERATURE: 98 F | SYSTOLIC BLOOD PRESSURE: 166 MMHG | DIASTOLIC BLOOD PRESSURE: 81 MMHG

## 2018-08-02 DIAGNOSIS — Z86.79 PERSONAL HISTORY OF OTHER DISEASES OF THE CIRCULATORY SYSTEM: ICD-10-CM

## 2018-08-02 DIAGNOSIS — Z85.9 PERSONAL HISTORY OF MALIGNANT NEOPLASM, UNSPECIFIED: ICD-10-CM

## 2018-08-02 PROCEDURE — 99204 OFFICE O/P NEW MOD 45 MIN: CPT

## 2018-08-27 ENCOUNTER — APPOINTMENT (OUTPATIENT)
Dept: OTOLARYNGOLOGY | Facility: CLINIC | Age: 83
End: 2018-08-27
Payer: MEDICARE

## 2018-08-27 VITALS
TEMPERATURE: 97.9 F | SYSTOLIC BLOOD PRESSURE: 132 MMHG | DIASTOLIC BLOOD PRESSURE: 60 MMHG | HEART RATE: 97 BPM | RESPIRATION RATE: 16 BRPM | OXYGEN SATURATION: 100 %

## 2018-08-27 DIAGNOSIS — H81.43 VERTIGO OF CENTRAL ORIGIN, BILATERAL: ICD-10-CM

## 2018-08-27 PROCEDURE — 99213 OFFICE O/P EST LOW 20 MIN: CPT

## 2018-09-06 ENCOUNTER — APPOINTMENT (OUTPATIENT)
Dept: OTOLARYNGOLOGY | Facility: CLINIC | Age: 83
End: 2018-09-06
Payer: MEDICARE

## 2018-09-06 VITALS — DIASTOLIC BLOOD PRESSURE: 81 MMHG | SYSTOLIC BLOOD PRESSURE: 139 MMHG

## 2018-09-06 DIAGNOSIS — H61.23 IMPACTED CERUMEN, BILATERAL: ICD-10-CM

## 2018-09-06 PROCEDURE — 99213 OFFICE O/P EST LOW 20 MIN: CPT | Mod: 25

## 2018-09-06 PROCEDURE — 92540 BASIC VESTIBULAR EVALUATION: CPT

## 2018-09-06 PROCEDURE — 92537 CALORIC VSTBLR TEST W/REC: CPT

## 2018-09-06 PROCEDURE — 92550 TYMPANOMETRY & REFLEX THRESH: CPT

## 2018-09-06 PROCEDURE — 92557 COMPREHENSIVE HEARING TEST: CPT

## 2018-09-06 PROCEDURE — 69210 REMOVE IMPACTED EAR WAX UNI: CPT

## 2018-09-18 ENCOUNTER — APPOINTMENT (OUTPATIENT)
Dept: NEUROLOGY | Facility: CLINIC | Age: 83
End: 2018-09-18
Payer: MEDICARE

## 2018-09-18 VITALS
WEIGHT: 110 LBS | OXYGEN SATURATION: 98 % | SYSTOLIC BLOOD PRESSURE: 130 MMHG | HEIGHT: 62 IN | BODY MASS INDEX: 20.24 KG/M2 | HEART RATE: 95 BPM | DIASTOLIC BLOOD PRESSURE: 83 MMHG

## 2018-09-18 DIAGNOSIS — R29.818 OTHER SYMPTOMS AND SIGNS INVOLVING THE NERVOUS SYSTEM: ICD-10-CM

## 2018-09-18 PROCEDURE — 99214 OFFICE O/P EST MOD 30 MIN: CPT

## 2018-09-18 RX ORDER — NORTRIPTYLINE HYDROCHLORIDE 10 MG/1
10 CAPSULE ORAL
Qty: 90 | Refills: 1 | Status: DISCONTINUED | COMMUNITY
Start: 2017-09-25 | End: 2018-09-18

## 2018-10-24 ENCOUNTER — APPOINTMENT (OUTPATIENT)
Dept: NEUROLOGY | Facility: CLINIC | Age: 83
End: 2018-10-24
Payer: MEDICARE

## 2018-10-24 VITALS
HEART RATE: 90 BPM | DIASTOLIC BLOOD PRESSURE: 76 MMHG | WEIGHT: 110 LBS | HEIGHT: 62 IN | OXYGEN SATURATION: 100 % | BODY MASS INDEX: 20.24 KG/M2 | SYSTOLIC BLOOD PRESSURE: 135 MMHG

## 2018-10-24 DIAGNOSIS — I67.1 CEREBRAL ANEURYSM, NONRUPTURED: ICD-10-CM

## 2018-10-24 PROCEDURE — 99214 OFFICE O/P EST MOD 30 MIN: CPT

## 2018-10-25 PROBLEM — I67.1 CEREBRAL ANEURYSM, NONRUPTURED: Status: ACTIVE | Noted: 2018-09-18

## 2018-11-15 ENCOUNTER — APPOINTMENT (OUTPATIENT)
Dept: ORTHOPEDIC SURGERY | Facility: CLINIC | Age: 83
End: 2018-11-15
Payer: MEDICARE

## 2018-11-15 VITALS
BODY MASS INDEX: 19.67 KG/M2 | HEIGHT: 62.8 IN | SYSTOLIC BLOOD PRESSURE: 100 MMHG | HEART RATE: 82 BPM | OXYGEN SATURATION: 97 % | DIASTOLIC BLOOD PRESSURE: 60 MMHG | WEIGHT: 111 LBS

## 2018-11-15 DIAGNOSIS — M81.0 AGE-RELATED OSTEOPOROSIS W/OUT CURRENT PATHOLOGICAL FRACTURE: ICD-10-CM

## 2018-11-15 PROCEDURE — 99214 OFFICE O/P EST MOD 30 MIN: CPT

## 2018-11-26 RX ORDER — SODIUM CHLORIDE 9 MG/ML
1000 INJECTION, SOLUTION INTRAVENOUS
Qty: 0 | Refills: 0 | Status: DISCONTINUED | OUTPATIENT
Start: 2018-11-27 | End: 2018-12-12

## 2018-11-27 ENCOUNTER — APPOINTMENT (OUTPATIENT)
Dept: INFUSION THERAPY | Facility: HOSPITAL | Age: 83
End: 2018-11-27

## 2018-11-27 ENCOUNTER — OUTPATIENT (OUTPATIENT)
Dept: OUTPATIENT SERVICES | Facility: HOSPITAL | Age: 83
LOS: 1 days | End: 2018-11-27
Payer: MEDICARE

## 2018-11-27 VITALS
HEART RATE: 88 BPM | OXYGEN SATURATION: 100 % | RESPIRATION RATE: 18 BRPM | WEIGHT: 110.01 LBS | HEIGHT: 63 IN | DIASTOLIC BLOOD PRESSURE: 84 MMHG | TEMPERATURE: 97 F | SYSTOLIC BLOOD PRESSURE: 148 MMHG

## 2018-11-27 DIAGNOSIS — M81.0 AGE-RELATED OSTEOPOROSIS WITHOUT CURRENT PATHOLOGICAL FRACTURE: ICD-10-CM

## 2018-11-27 DIAGNOSIS — Z98.89 OTHER SPECIFIED POSTPROCEDURAL STATES: Chronic | ICD-10-CM

## 2018-11-27 PROCEDURE — 96361 HYDRATE IV INFUSION ADD-ON: CPT

## 2018-11-27 PROCEDURE — 96365 THER/PROPH/DIAG IV INF INIT: CPT

## 2018-11-27 RX ORDER — ZOLEDRONIC ACID 5 MG/100ML
2.5 INJECTION, SOLUTION INTRAVENOUS ONCE
Qty: 0 | Refills: 0 | Status: COMPLETED | OUTPATIENT
Start: 2018-11-27 | End: 2018-11-27

## 2018-11-27 RX ORDER — ACETAMINOPHEN 500 MG
650 TABLET ORAL ONCE
Qty: 0 | Refills: 0 | Status: COMPLETED | OUTPATIENT
Start: 2018-11-27 | End: 2018-11-27

## 2018-11-27 RX ADMIN — ZOLEDRONIC ACID 50 MILLIGRAM(S): 5 INJECTION, SOLUTION INTRAVENOUS at 13:35

## 2018-11-27 RX ADMIN — SODIUM CHLORIDE 1000 MILLILITER(S): 9 INJECTION, SOLUTION INTRAVENOUS at 13:25

## 2018-11-27 RX ADMIN — SODIUM CHLORIDE 1000 MILLILITER(S): 9 INJECTION, SOLUTION INTRAVENOUS at 13:24

## 2019-02-06 ENCOUNTER — MEDICATION RENEWAL (OUTPATIENT)
Age: 84
End: 2019-02-06

## 2019-04-17 ENCOUNTER — APPOINTMENT (OUTPATIENT)
Dept: NEUROLOGY | Facility: CLINIC | Age: 84
End: 2019-04-17

## 2019-05-28 ENCOUNTER — APPOINTMENT (OUTPATIENT)
Dept: NEUROLOGY | Facility: CLINIC | Age: 84
End: 2019-05-28
Payer: MEDICARE

## 2019-05-28 VITALS
WEIGHT: 111 LBS | BODY MASS INDEX: 20.43 KG/M2 | DIASTOLIC BLOOD PRESSURE: 85 MMHG | OXYGEN SATURATION: 99 % | TEMPERATURE: 98.3 F | HEART RATE: 89 BPM | HEIGHT: 62 IN | SYSTOLIC BLOOD PRESSURE: 146 MMHG

## 2019-05-28 VITALS — DIASTOLIC BLOOD PRESSURE: 81 MMHG | SYSTOLIC BLOOD PRESSURE: 152 MMHG

## 2019-05-28 PROCEDURE — 99214 OFFICE O/P EST MOD 30 MIN: CPT

## 2019-05-28 RX ORDER — CLINDAMYCIN HYDROCHLORIDE 300 MG/1
300 CAPSULE ORAL
Qty: 21 | Refills: 0 | Status: DISCONTINUED | COMMUNITY
Start: 2018-04-10 | End: 2019-05-28

## 2019-05-28 NOTE — PHYSICAL EXAM
[FreeTextEntry1] : General: sitting on exam table comfortably, NAD\par Head: no tenderness on palpation of temples or scalp\par CV: RRR	\par Extremities: FROMx4, 2+ radial pulses bilaterally\par 		\par Neurological exam:	\par Mental status: AA&O x 3, fluent, spontaneous speech, no dysarthria, follows all commands, able give full history of present and past events, memory - focused today though attention is variable on testing, fund of knowledge intact\par Cranial nerves: EOMI, VFF, facial sensation intact, no facial droop, tongue midline\par Motor: No pronator drift, 5/5 x4, normal tone and bulk\par Sensory: Intact light touch bilaterally\par Cerebellar: FNF intact bilaterally\par Gait: steady

## 2019-05-28 NOTE — HISTORY OF PRESENT ILLNESS
[FreeTextEntry1] : 88 year-old female presents for follow up/ of her headaches, in addition to other complaints including vertigo, history of stroke and memory deficits..\par \par The headaches have improved and she's not as wobbly since she increased the dose of Nortriptyline to 2-10mg tablets so she feels much better.  \par \par 1) Headaches - She still has headaches since increased stress due to life stressors.  They occur in her ears and back of head.  They only rate 4/10 and last about 5 minutes.  They sometimes come with ocular auras but not as auras.  She's still taking Nortriptyline 20mg at bedtime.  She got nauseated and vomited up pills at night twice.\par \par 2) Vertigo - Much better.  She's walking on her own without use of cane or walker.  \par \par 3) Decreasing stroke risk - No new weakness, numbness or speech deficits.\par a) Atrial fibrillation - No palpitations.  She's compliant with her Eliquis.  No bleeding in her urine or stool.\par - She's still on the Florinef to maintain her blood pressure. \par - Off Premarin - approved by Dr. Sena.\par \par 4) Memory loss - No recent change.\par \par 5) Cerebral aneurysm - Refused referral to Dr. Ybarra

## 2019-05-28 NOTE — REVIEW OF SYSTEMS
[Sleep Disturbances] : sleep disturbances [Eyesight Problems] : eyesight problems [As Noted in HPI] : as noted in HPI [Loss Of Hearing] : hearing loss [Negative] : Heme/Lymph [de-identified] : Also has some back pain - She's taking Tylenol 1-4 capsules per day as needed for acute back pain. [de-identified] : Follows with her Psychiatrist. [FreeTextEntry4] : She had ears cleaned while in [place] in past few months [FreeTextEntry5] : follows with Dr. Goldberg at [facility]  [de-identified] : She just started taking Viviscal to stimulate hair growth. [de-identified] : follows with Dr. Sena - received infusion medication for her bones [FreeTextEntry1] : lots of allergies in Spring

## 2019-05-28 NOTE — ASSESSMENT
[FreeTextEntry1] : 88 year-old female presents for follow up for headaches.\par \par Plan:\par 1) Headache with visual auras followed by headaches - relatively stable. \par - Continue Nortriptyline 2-10mg at bedtime for the next month while allergy season most active since allergies are one of her stressors.  \par - Can consider decrease to 1-10mg Nortriptyline then with reassessment after 6-8 week trial on lower dose.\par - She can treat acute symptomatic relief with Tylenol.\par \par 2) History of stroke - \par - Continue Eliquis 2.5mg BID (age, weight) for anticoagulation given atrial fibrillation.  \par - remain off Premarin\par  \par 3) Cerebral aneurysm - No intervention at this time since it's incidental and stable in size and patient refusal.

## 2019-07-22 ENCOUNTER — EMERGENCY (EMERGENCY)
Facility: HOSPITAL | Age: 84
LOS: 1 days | Discharge: HOME | End: 2019-07-22
Attending: EMERGENCY MEDICINE | Admitting: EMERGENCY MEDICINE
Payer: MEDICARE

## 2019-07-22 VITALS
RESPIRATION RATE: 18 BRPM | WEIGHT: 109.57 LBS | DIASTOLIC BLOOD PRESSURE: 73 MMHG | HEART RATE: 98 BPM | TEMPERATURE: 98 F | SYSTOLIC BLOOD PRESSURE: 115 MMHG | OXYGEN SATURATION: 99 %

## 2019-07-22 DIAGNOSIS — Z79.899 OTHER LONG TERM (CURRENT) DRUG THERAPY: ICD-10-CM

## 2019-07-22 DIAGNOSIS — Z91.018 ALLERGY TO OTHER FOODS: ICD-10-CM

## 2019-07-22 DIAGNOSIS — R42 DIZZINESS AND GIDDINESS: ICD-10-CM

## 2019-07-22 DIAGNOSIS — Z91.040 LATEX ALLERGY STATUS: ICD-10-CM

## 2019-07-22 DIAGNOSIS — Z98.89 OTHER SPECIFIED POSTPROCEDURAL STATES: Chronic | ICD-10-CM

## 2019-07-22 DIAGNOSIS — Z88.0 ALLERGY STATUS TO PENICILLIN: ICD-10-CM

## 2019-07-22 DIAGNOSIS — E03.9 HYPOTHYROIDISM, UNSPECIFIED: ICD-10-CM

## 2019-07-22 DIAGNOSIS — Z79.891 LONG TERM (CURRENT) USE OF OPIATE ANALGESIC: ICD-10-CM

## 2019-07-22 LAB — TROPONIN T SERPL-MCNC: <0.01 NG/ML — SIGNIFICANT CHANGE UP (ref 0–0.01)

## 2019-07-22 PROCEDURE — 99284 EMERGENCY DEPT VISIT MOD MDM: CPT | Mod: 25

## 2019-07-22 PROCEDURE — 80053 COMPREHEN METABOLIC PANEL: CPT

## 2019-07-22 PROCEDURE — 84484 ASSAY OF TROPONIN QUANT: CPT

## 2019-07-22 PROCEDURE — 70496 CT ANGIOGRAPHY HEAD: CPT | Mod: 26

## 2019-07-22 PROCEDURE — 70496 CT ANGIOGRAPHY HEAD: CPT

## 2019-07-22 PROCEDURE — 93010 ELECTROCARDIOGRAM REPORT: CPT | Mod: 76

## 2019-07-22 PROCEDURE — 82962 GLUCOSE BLOOD TEST: CPT

## 2019-07-22 PROCEDURE — 84443 ASSAY THYROID STIM HORMONE: CPT

## 2019-07-22 PROCEDURE — 70498 CT ANGIOGRAPHY NECK: CPT

## 2019-07-22 PROCEDURE — 93005 ELECTROCARDIOGRAM TRACING: CPT

## 2019-07-22 PROCEDURE — 70498 CT ANGIOGRAPHY NECK: CPT | Mod: 26

## 2019-07-22 PROCEDURE — 85025 COMPLETE CBC W/AUTO DIFF WBC: CPT

## 2019-07-22 PROCEDURE — 36415 COLL VENOUS BLD VENIPUNCTURE: CPT

## 2019-07-22 RX ORDER — SODIUM CHLORIDE 9 MG/ML
1000 INJECTION INTRAMUSCULAR; INTRAVENOUS; SUBCUTANEOUS ONCE
Refills: 0 | Status: COMPLETED | OUTPATIENT
Start: 2019-07-22 | End: 2019-07-22

## 2019-07-22 RX ADMIN — SODIUM CHLORIDE 1000 MILLILITER(S): 9 INJECTION INTRAMUSCULAR; INTRAVENOUS; SUBCUTANEOUS at 19:07

## 2019-07-22 NOTE — ED PROVIDER NOTE - PROGRESS NOTE DETAILS
Klepfish: CTA no acute pathology. LAbs notable for thrombocytopenia, pt states she has hx of such. Currently asymptomatic (other than mild back pain from laying in stretcher). Given age and persistent recurrent symptoms, offered and recommended admission for further neuro (?MRI) or cardiac w/u (monitoring).  Explained risks of worsening symptoms, recurrent fall especially w/ low platelets. While pt here in ED, no arrhythmias. Pt prefers to go home and f/u w/ her own pmd/cards. Comfortable for dc.

## 2019-07-22 NOTE — ED PROVIDER NOTE - NSFOLLOWUPINSTRUCTIONS_ED_ALL_ED_FT
Can take tylenol every 6hrs as needed for pain.  Stay well hydrated.  Return for fevers, persistent vomit, uncontrolled pain, worsening breathing, worsening lightheaded, worsening symptoms, increased falls.   Follow up with primary doctor within 1-2 days.   Ambulate w/ cane.

## 2019-07-22 NOTE — ED ADULT NURSE NOTE - OBJECTIVE STATEMENT
Pt A&O x3 CO Dizziness x2 weeks.  EKG obtained in front triage.  FSB.  Pt states "I have Afib and sometimes my blood pressure jumps around."  Speech clear.  Ambulating with 1 assist, baseline per pt.  No facial droop, strength and ROM at baseline x4 Ext.  PT denies N/V/D, SOB, Fevers

## 2019-07-22 NOTE — ED PROVIDER NOTE - CLINICAL SUMMARY MEDICAL DECISION MAKING FREE TEXT BOX
88F PMH pafib (eliquis, ?prior ablation/cardioversion), hypothyroid, uterine ca ("many yrs ago," s/p hysterectomy), thrombocytopenia (unclear etiology, pt unsure of baseline, states it is "falsely low.") MV repair p/w ~1w of feeling lightheaded/off balance, intermittent, worse w/ ambulating. YEsterday lowered herself to floor bec of symptoms, no trauma. Has measured her BP at home and sometimes as low as SBP 90s. Currently asymptomatic at rest and w/ walking in ED. Vitals wnl, exam as above.  ddx: Near syncope vs. vertigo, possibly central  CBC, cmp, TSH, trop. IVF, CTA head/neck, reassess

## 2019-07-22 NOTE — ED PROVIDER NOTE - ST/T WAVE
CICI isolated to V2 and STD 3 grossly unchanged from prior. HAs ~1mm STD v4-6, similar to prior, possibly slightly more pronounced

## 2019-07-22 NOTE — ED PROVIDER NOTE - PHYSICAL EXAMINATION
PERRL, EOMI, no nystagmus. CN intact. Strength 5/5. Normal F-->N, H-->S. Steady unassisted gait. No pronator drift. Sensation intact. No carotid bruits.   trace b/l LE pitting edema, normal equal distal pulses.  No spinal ttp, neck FROM. No bony ttp, FROM all extremities.

## 2019-07-22 NOTE — ED PROVIDER NOTE - OBJECTIVE STATEMENT
88F PMH pafib (eliquis, ?prior ablation/cardioversion), hypothyroid, uterine ca ("many yrs ago," s/p hysterectomy), thrombocytopenia (unclear etiology, pt unsure of baseline, states it is "falsely low.") MV repair p/w ~1w of feeling lightheaded/off balance, intermittent, worse w/ ambulating. YEsterday lowered herself to floor bec of symptoms, no trauma. Has measured her BP at home and sometimes as low as SBP 90s. Currently asymptomatic at rest and w/ walking in ED. Denies HA, vision changes, focal weakness/numbness, rashes, tinnitus, hearing changes, URI symptoms, f/c, SOB/CP, NVD, abd pain, urinary complaints, black/bloody stool. Has chronic intemrittent b/l LE edema, improved from baseline. No recent med changes.

## 2019-07-23 VITALS
SYSTOLIC BLOOD PRESSURE: 148 MMHG | DIASTOLIC BLOOD PRESSURE: 94 MMHG | TEMPERATURE: 98 F | HEART RATE: 94 BPM | RESPIRATION RATE: 16 BRPM | OXYGEN SATURATION: 99 %

## 2019-07-25 ENCOUNTER — APPOINTMENT (OUTPATIENT)
Dept: HEART AND VASCULAR | Facility: CLINIC | Age: 84
End: 2019-07-25
Payer: MEDICARE

## 2019-07-25 ENCOUNTER — NON-APPOINTMENT (OUTPATIENT)
Age: 84
End: 2019-07-25

## 2019-07-25 VITALS
WEIGHT: 110 LBS | BODY MASS INDEX: 20.24 KG/M2 | DIASTOLIC BLOOD PRESSURE: 61 MMHG | SYSTOLIC BLOOD PRESSURE: 129 MMHG | HEIGHT: 62 IN | HEART RATE: 87 BPM

## 2019-07-25 PROCEDURE — 93000 ELECTROCARDIOGRAM COMPLETE: CPT

## 2019-07-25 PROCEDURE — 99214 OFFICE O/P EST MOD 30 MIN: CPT | Mod: 25

## 2019-07-29 NOTE — REVIEW OF SYSTEMS
[see HPI] : see HPI [Dizziness] : dizziness [Depression] : depression [Anxiety] : anxiety [Negative] : Heme/Lymph [Fever] : no fever [Chills] : no chills [Convulsions] : no convulsions [Tremor] : no tremor was seen

## 2019-07-29 NOTE — DISCUSSION/SUMMARY
[FreeTextEntry1] : Mrs. Javier is an 88 year old female with ocular migraines, hypothyroidism, autonomic dysfunction, mitral valve repair, thrombocytopenia, diastolic heart failure and paroxysmal atrial fibrillation s/p cardioversion 9/2016 and 10/2017, who presents for follow up. She continues to complain of dizziness and she was reassured this is not a heart rhythm issue.  She is dizzy in the office today and is in normal sinus rhythm.  I have encouraged her to follow up with neurology and ENT to further assess this.      She understands that she is off antiarrhythmics and can go back into atrial fibrillation in the future and will remain on oral anticoagulation.   She was reassured that from a heart rhythm perspective she is doing well.  She will follow up in 6 months or sooner if needed.  She knows to call with any questions or concerns in the interm.

## 2019-07-29 NOTE — PHYSICAL EXAM
[General Appearance - Well Developed] : well developed [Normal Appearance] : normal appearance [Well Groomed] : well groomed [General Appearance - Well Nourished] : well nourished [No Deformities] : no deformities [General Appearance - In No Acute Distress] : no acute distress [Normal Conjunctiva] : the conjunctiva exhibited no abnormalities [Normal Oropharynx] : normal oropharynx [Normal Jugular Venous V Waves Present] : normal jugular venous V waves present [Respiration, Rhythm And Depth] : normal respiratory rhythm and effort [Exaggerated Use Of Accessory Muscles For Inspiration] : no accessory muscle use [Auscultation Breath Sounds / Voice Sounds] : lungs were clear to auscultation bilaterally [Heart Rate And Rhythm] : heart rate and rhythm were normal [Heart Sounds] : normal S1 and S2 [Edema] : no peripheral edema present [Abnormal Walk] : normal gait [Gait - Sufficient For Exercise Testing] : the gait was sufficient for exercise testing [] : no ischemic changes [Skin Color & Pigmentation] : normal skin color and pigmentation [Oriented To Time, Place, And Person] : oriented to person, place, and time [Affect] : the affect was normal [Mood] : the mood was normal [FreeTextEntry1] : anxiety

## 2019-07-29 NOTE — HISTORY OF PRESENT ILLNESS
[FreeTextEntry1] : Mrs. Javier is an 88 year old female with ocular migraines, hypothyroidism, autonomic dysfunction, mitral valve repair, thrombocytopenia, diastolic heart failure and paroxysmal atrial fibrillation s/p cardioversion 9/2016 and 10/2017, who presents for follow up.\par \par Mrs. Javier was admitted 9/2016 with afib.  She underwent cardioversion, which was unsuccessful and then started on amiodarone.  She subsequently self converted.  She is “unsure” when or why she stopped amiodarone.  She has a history of vertigo and vasovagal syncope.  She was subsequently seen in the hospital and refused ILR or long term monitoring.  \par \par She did not follow up 10/2017 when she presented to the ER with symptomatic atrial fibrillation. She was rate controlled and discharged home.  She underwent an elective cardioversion and was seen in our office a week later back in atrial fibrillation.  She was started on amiodarone and was bradycardic with a prolonged QTc.  She had a bilateral arm tremor.  Her amiodarone was stopped and she no longer has a tremor.\par \par She presents for follow up after being discharged from the ER on early this week with dizziness.  She was in NSR.  She is still dizzy today in NSR.  She is compliant with Eliquis.  Overall, she continues to do well and denies any chest pain,SOB, palpitations, syncope, orthopnea, PND.  She has not seen an ENT as of yet.  \par \par

## 2019-08-16 ENCOUNTER — APPOINTMENT (OUTPATIENT)
Dept: OTOLARYNGOLOGY | Facility: CLINIC | Age: 84
End: 2019-08-16

## 2019-08-28 ENCOUNTER — APPOINTMENT (OUTPATIENT)
Dept: NEUROLOGY | Facility: CLINIC | Age: 84
End: 2019-08-28
Payer: MEDICARE

## 2019-08-28 VITALS
HEIGHT: 62 IN | TEMPERATURE: 97.9 F | SYSTOLIC BLOOD PRESSURE: 139 MMHG | DIASTOLIC BLOOD PRESSURE: 84 MMHG | OXYGEN SATURATION: 99 % | BODY MASS INDEX: 20.24 KG/M2 | WEIGHT: 110 LBS | HEART RATE: 80 BPM

## 2019-08-28 DIAGNOSIS — R51 HEADACHE: ICD-10-CM

## 2019-08-28 DIAGNOSIS — R41.3 OTHER AMNESIA: ICD-10-CM

## 2019-08-28 DIAGNOSIS — R42 DIZZINESS AND GIDDINESS: ICD-10-CM

## 2019-08-28 PROCEDURE — 99214 OFFICE O/P EST MOD 30 MIN: CPT

## 2019-08-28 RX ORDER — NEOMYCIN SULFATE, POLYMYXIN B SULFATE, HYDROCORTISONE 3.5; 10000; 1 MG/ML; [USP'U]/ML; MG/ML
1 SOLUTION/ DROPS AURICULAR (OTIC)
Qty: 10 | Refills: 0 | Status: DISCONTINUED | COMMUNITY
Start: 2017-11-27 | End: 2019-08-28

## 2019-08-28 RX ORDER — ALENDRONATE SODIUM 70 MG/1
70 TABLET ORAL
Qty: 4 | Refills: 11 | Status: DISCONTINUED | COMMUNITY
Start: 2018-07-26 | End: 2019-08-28

## 2019-08-28 NOTE — DATA REVIEWED
[de-identified] : CTA Head and Neck (Performed 7/22/2019): \par 1. No intracranial stenosis.\par 2. Calcification at the bilateral carotid bulbs without evidence of significant stenosis. No significant interval change in appearance in the prior study of \par 7/7/2017.

## 2019-08-28 NOTE — HISTORY OF PRESENT ILLNESS
[FreeTextEntry1] : 88 year-old female presents for follow up of her headaches, in addition to other complaints including vertigo and memory deficits.\par \par 1) Headaches - Her headaches worsened with feeling of 'wobbliness' when she lowered the dose to one tablet of Nortriptyline so she increased back to 2-10mg tablets.  The headaches have now subsided with only rare ocular changes.  \par \par 2) Vertigo - She complains of intermittent episodes of severe spinning sensation.  She feels like she's spinning.  It was especially symptomatic yesterday with need to lie on the ground.  She also went to [facility] ER on July 22nd for evaluation with CTA Head and Neck that was supposedly negative.  She thinks that she's drinking enough water.  She's also on Florinef to maintain her blood pressure.  She rarely uses a cane or walker but does hold on to people for support when needed.  She was evaluated by Dr. Menchaca who didn't think that her dizziness was related to her heart.  He recommended that she start Claritin daily which she started.  She's not sure that it helps the dizziness but it does clear her sinuses.  She thinks that she has an inner ear problem but denies hearing deficits or ringing in her ears.  She doesn't have time to return to vestibular rehab due to problems with her apartment.  She has the papers with the exercises at home.\par \par 4) Memory loss - She's noticed intermittent memory deficits but she's not worried about it right now.  It can be worse when she gets emotional.

## 2019-08-28 NOTE — CONSULT LETTER
[Dear  ___] : Dear  [unfilled], [Courtesy Letter:] : I had the pleasure of seeing your patient, [unfilled], in my office today. [FreeTextEntry2] : Kyung Liang MD\par [address] [place] [address], Office A\par [place], [place]

## 2019-08-28 NOTE — ASSESSMENT
[FreeTextEntry1] : 88 year-old female presents for follow up of her headaches, in addition to other complaints including vertigo and memory deficits.  Her neurological exam is stable.\par \par Plan:\par 1) Headache with visual auras - relatively controlled on following medication regimen - \par - Continue Nortriptyline 2-10mg at bedtime for prophylactic relief of headaches.  \par - She can treat acute symptomatic relief with Tylenol.\par \par 2) Vertigo - Her dizziness seems most compatible with peripheral vertigo.\par - Recommended that she resume the vestibular exercises that were given to her by her therapist in the past.\par - Discussed benefits of Meclizine 12.5mg for acute treatment of spinning only, if not contraindicated by her underlying medical disease.\par - Encouraged daily hydration \par - Further evaluation by ENT if the vertigo continues\par \par 3) Memory loss - variable.  Would observe for now, especially since she refused medications.\par \par Thank you for allowing me to participate in the care of your patient.  If you have any questions, please feel free to call me at [phone].

## 2019-08-28 NOTE — PHYSICAL EXAM
[FreeTextEntry1] : General: sitting on exam table comfortably, NAD\par Head/Neck: no tenderness on palpation of temples or scalp or back of neck\par CV: RRR	\par Extremities: FROMx4, 2+ radial pulses bilaterally\par 		\par Neurological exam:	\par Mental status: AA&O x 3, fluent, spontaneous speech, no dysarthria, follows all commands, able give full history of present and past events, memory reasonable, attention intact, fund of knowledge intact\par Cranial nerves: EOMI, PERRL+, no nystagmus, VFF, facial sensation intact, no facial droop, hearing intact to snap bilaterally, Lopez - Midline, tongue midline\par Motor: No pronator drift, 5/5 x4, normal tone and bulk\par Sensory: Intact light touch bilaterally.  Negative Romberg\par Cerebellar: FNF and HKS intact bilaterally\par Gait: steady

## 2019-08-28 NOTE — REVIEW OF SYSTEMS
[Sleep Disturbances] : sleep disturbances [Eyesight Problems] : eyesight problems [As Noted in HPI] : as noted in HPI [Loss Of Hearing] : hearing loss [Negative] : Heme/Lymph [Palpitations] : no palpitations [de-identified] : Also has some back pain - She's taking Tylenol 1-4 capsules per day as needed for acute back pain. [de-identified] : Follows with her Psychiatrist. [FreeTextEntry5] : Atrial fibrillation - She's compliant with her Eliquis.  No bleeding in her urine or stool.  [de-identified] : She just started taking Viviscal to stimulate hair growth. [de-identified] : follows with Dr. Sena - received infusion medication for her bones [FreeTextEntry1] : lots of allergies in Spring

## 2020-01-02 ENCOUNTER — APPOINTMENT (OUTPATIENT)
Dept: ORTHOPEDIC SURGERY | Facility: CLINIC | Age: 85
End: 2020-01-02

## 2020-01-06 ENCOUNTER — APPOINTMENT (OUTPATIENT)
Dept: NEUROLOGY | Facility: CLINIC | Age: 85
End: 2020-01-06

## 2020-02-27 ENCOUNTER — APPOINTMENT (OUTPATIENT)
Dept: HEART AND VASCULAR | Facility: CLINIC | Age: 85
End: 2020-02-27
Payer: MEDICARE

## 2020-02-27 ENCOUNTER — NON-APPOINTMENT (OUTPATIENT)
Age: 85
End: 2020-02-27

## 2020-02-27 VITALS
HEART RATE: 101 BPM | SYSTOLIC BLOOD PRESSURE: 158 MMHG | HEIGHT: 62 IN | DIASTOLIC BLOOD PRESSURE: 85 MMHG | WEIGHT: 110 LBS | BODY MASS INDEX: 20.24 KG/M2

## 2020-02-27 DIAGNOSIS — I95.9 HYPOTENSION, UNSPECIFIED: ICD-10-CM

## 2020-02-27 DIAGNOSIS — R60.0 LOCALIZED EDEMA: ICD-10-CM

## 2020-02-27 DIAGNOSIS — I48.91 UNSPECIFIED ATRIAL FIBRILLATION: ICD-10-CM

## 2020-02-27 PROCEDURE — 93000 ELECTROCARDIOGRAM COMPLETE: CPT

## 2020-02-27 PROCEDURE — 99214 OFFICE O/P EST MOD 30 MIN: CPT | Mod: 25

## 2020-03-02 ENCOUNTER — OUTPATIENT (OUTPATIENT)
Dept: OUTPATIENT SERVICES | Facility: HOSPITAL | Age: 85
LOS: 1 days | Discharge: HOME | End: 2020-03-02
Payer: MEDICARE

## 2020-03-02 DIAGNOSIS — Z98.89 OTHER SPECIFIED POSTPROCEDURAL STATES: Chronic | ICD-10-CM

## 2020-03-02 PROBLEM — I48.91 ATRIAL FIBRILLATION: Status: ACTIVE | Noted: 2017-10-23

## 2020-03-02 PROBLEM — R60.0 EDEMA, LOWER EXTREMITY: Status: ACTIVE | Noted: 2020-03-02

## 2020-03-02 PROCEDURE — 92960 CARDIOVERSION ELECTRIC EXT: CPT

## 2020-03-02 RX ORDER — ACETAMINOPHEN 325 MG/1
TABLET, FILM COATED ORAL
Refills: 0 | Status: ACTIVE | COMMUNITY

## 2020-03-02 RX ORDER — DILTIAZEM HYDROCHLORIDE 30 MG/1
30 TABLET ORAL
Qty: 270 | Refills: 0 | Status: ACTIVE | COMMUNITY
Start: 2020-03-02

## 2020-03-02 RX ORDER — FUROSEMIDE 20 MG/1
20 TABLET ORAL
Qty: 30 | Refills: 0 | Status: ACTIVE | COMMUNITY
Start: 2020-03-02

## 2020-03-02 RX ORDER — NORTRIPTYLINE HYDROCHLORIDE 10 MG/1
10 CAPSULE ORAL
Qty: 180 | Refills: 1 | Status: ACTIVE | COMMUNITY
Start: 2018-09-18

## 2020-03-02 RX ORDER — APIXABAN 2.5 MG/1
2.5 TABLET, FILM COATED ORAL
Refills: 0 | Status: ACTIVE | COMMUNITY

## 2020-03-02 RX ORDER — FLUDROCORTISONE ACETATE 0.1 MG/1
0.1 TABLET ORAL
Refills: 0 | Status: DISCONTINUED | COMMUNITY
End: 2020-03-02

## 2020-03-02 RX ORDER — CHROMIUM 200 MCG
TABLET ORAL
Refills: 0 | Status: ACTIVE | COMMUNITY

## 2020-03-02 RX ORDER — DRONEDARONE 400 MG/1
400 TABLET, FILM COATED ORAL TWICE DAILY
Qty: 60 | Refills: 3 | Status: ACTIVE | COMMUNITY
Start: 2020-03-02 | End: 1900-01-01

## 2020-03-02 RX ORDER — LEVOTHYROXINE SODIUM 50 UG/1
50 TABLET ORAL DAILY
Refills: 0 | Status: ACTIVE | COMMUNITY

## 2020-03-02 RX ORDER — CICLOPIROX 80 MG/ML
8 SOLUTION TOPICAL
Qty: 66 | Refills: 0 | Status: DISCONTINUED | COMMUNITY
Start: 2017-11-27 | End: 2020-03-02

## 2020-03-02 RX ORDER — MECLIZINE HYDROCHLORIDE 12.5 MG/1
12.5 TABLET ORAL
Qty: 30 | Refills: 0 | Status: DISCONTINUED | COMMUNITY
Start: 2019-08-28 | End: 2020-03-02

## 2020-03-02 NOTE — DISCUSSION/SUMMARY
[Atrial Fibrillation] : atrial fibrillation [Medication Changes Per Orders] : as documented in orders [Decompensated] : decompensated [Electrical Cardioversion] : electrical cardioversion [Patient] : the patient

## 2020-03-04 NOTE — ADDENDUM
[FreeTextEntry1] : I, Eyad Menchaca, hereby attest that the medical record entry for this patient accurately reflects signatures/notations that I made on the Date of Service in my capacity as an Attending Physician when I treated/diagnosed the above patient. I do hereby attest that this information is true, accurate and complete to the best of my knowledge and I understand that any falsification, omission, or concealment of material fact may subject me to administrative, civil, or, criminal liability.

## 2020-03-04 NOTE — REVIEW OF SYSTEMS
[Dizziness] : dizziness [see HPI] : see HPI [Depression] : depression [Anxiety] : anxiety [Negative] : Heme/Lymph [Fever] : no fever [Chills] : no chills [Tremor] : no tremor was seen [Convulsions] : no convulsions

## 2020-03-04 NOTE — PHYSICAL EXAM
[Well Groomed] : well groomed [General Appearance - Well Developed] : well developed [Normal Appearance] : normal appearance [No Deformities] : no deformities [General Appearance - Well Nourished] : well nourished [General Appearance - In No Acute Distress] : no acute distress [Normal Conjunctiva] : the conjunctiva exhibited no abnormalities [Normal Oropharynx] : normal oropharynx [Normal Jugular Venous V Waves Present] : normal jugular venous V waves present [Respiration, Rhythm And Depth] : normal respiratory rhythm and effort [Auscultation Breath Sounds / Voice Sounds] : lungs were clear to auscultation bilaterally [Exaggerated Use Of Accessory Muscles For Inspiration] : no accessory muscle use [Heart Rate And Rhythm] : heart rate and rhythm were normal [Heart Sounds] : normal S1 and S2 [Edema] : no peripheral edema present [Abnormal Walk] : normal gait [Gait - Sufficient For Exercise Testing] : the gait was sufficient for exercise testing [] : no ischemic changes [Skin Color & Pigmentation] : normal skin color and pigmentation [Oriented To Time, Place, And Person] : oriented to person, place, and time [Mood] : the mood was normal [Affect] : the affect was normal [5th Left ICS - MCL] : palpated at the 5th LICS in the midclavicular line [Irregularly Irregular] : irregularly irregular [Normal] : normal [Tachycardia] : tachycardic [Normal S2] : normal S2 [S1 Varying Intensity] : was of varying intensity [Normal S1] : normal S1 [FreeTextEntry1] : anxiety

## 2020-03-04 NOTE — HISTORY OF PRESENT ILLNESS
[FreeTextEntry1] : Mrs. Javier is an 89 year old female with ocular migraines, hypothyroidism, autonomic dysfunction, mitral valve repair, thrombocytopenia, diastolic heart failure and paroxysmal atrial fibrillation s/p cardioversion 9/2016 and 10/2017, who presents for follow up.\par \par History includes admission in 9/2016 with afib. She underwent unsuccessful cardioversion and was started on amiodarone after which she converted back to sinus.  She is unsure when or why she stopped amiodarone. She has a history of vertigo and vasovagal syncope and presented to the hospital for this, but refused ILR or long term monitoring.  \par \par She did not follow up after her initial cardioversion. She presented to the ER 10/2017 with symptomatic atrial fibrillation. She was rate controlled and discharged home. She underwent an elective outpatient cardioversion and was seen in our office a week later back in atrial fibrillation.  She was started on amiodarone and was found to be bradycardic with a prolonged QTc.  Additionally she had a bilateral arm tremor. Amiodarone was stopped and the tremor disappeared. \par \par She was hospitalized in [place] two weeks ago for AF w/ RVR. She notes that when in AF, she feels "discombobulated." She was not cardioverted, but instead discharged with rate control. Of note, during the hospitalization she had a vasovagal syncopal episode after urinating. \par \par She presents to the office today continuing to c/o feeling discombobulated. Denies palpitations, c/p, sob, lightheadedness and syncope.\par \par

## 2020-03-19 ENCOUNTER — APPOINTMENT (OUTPATIENT)
Dept: HEART AND VASCULAR | Facility: CLINIC | Age: 85
End: 2020-03-19

## 2020-05-05 ENCOUNTER — APPOINTMENT (OUTPATIENT)
Dept: HEART AND VASCULAR | Facility: CLINIC | Age: 85
End: 2020-05-05
Payer: MEDICARE

## 2020-05-05 PROCEDURE — 99443: CPT

## 2020-05-05 NOTE — HISTORY OF PRESENT ILLNESS
[Verbal consent obtained from patient] : the patient, [unfilled] [FreeTextEntry1] : Attempted telehealth visit, but patient did not use the link to establish a connection with her cellphone. Used telephonic visit, which patient had consented to. Patient at home while I am in the hospital office.\par \par She was last cardioverted in March (03/02) and had been on multaq from two days prior to that. She denies any arrhythmic symptoms and notes that with her blood pressure machine, her BPs over the last 24 hours have ranged from 107-125/60-70 with heart rates ranging from 69-81. She traditionally has atrial fibrillationwith a moderate ventricular response so I suspect she remains in sinus rhythm. She has no complaints of palpitatio.\par She does get episodic waves of dizziness, that have correlated with her history of autonomic imbalance. These episodes all pass within minutes to less than one hour and she has not had any recent syncope. She continues on the eliquis at 2.5 mg bid, cardizem 30 mg tid and takes multaq 400 mg but only once per day. She did not tolerated the more frequent dosing secondary to gi complaints. I told her to continue dosing as she has done; specifically as she was intolerant to amiodarone in the past (neuropathic symptoms).\par She also takes a diuretic two times per week or so, tailored to whether she has any peripheral edema. Currently, she says her ankles are flat.\par She has no complaints, but wishes to fly back to [place] as she has steps in her dwelling here that are limiting. I told her to make another appointment with me in 4-6 weeks and we would see her rhythm by ECG. She understands all instructions. All questions answered.\par I spent 23 minutes with Ms. Javier concerning her medical follow-up.

## 2020-05-28 ENCOUNTER — APPOINTMENT (OUTPATIENT)
Dept: HEART AND VASCULAR | Facility: CLINIC | Age: 85
End: 2020-05-28
Payer: MEDICARE

## 2020-05-28 PROCEDURE — 99441: CPT

## 2020-06-01 NOTE — HISTORY OF PRESENT ILLNESS
[Verbal consent obtained from patient] : the patient, [unfilled] [FreeTextEntry1] : Used telephonic visit, which patient had consented to. Patient at home while I am in the hospital office.\par \par Mrs. Javier is an 89 year old female with ocular migraines, hypothyroidism, autonomic dysfunction, mitral valve repair, thrombocytopenia, diastolic heart failure and paroxysmal atrial fibrillation s/p cardioversion 9/2016 and 10/2017.\par She was last cardioverted in March (03/02) and had been on multaq from two days prior to that. She denies any arrhythmic symptoms and notes that with her blood pressure machine, her BPs over the last 24 hours have ranged from 102/56 with heart rates in the 70s.  She saw her primary doctor yesterday who told her she was in NSR.  She traditionally has atrial fibrillation with a moderate ventricular response so I suspect she remains in sinus rhythm. She has no complaints of palpitations.  She remains on diltiazem, once a day Multaq and twice a day eliquis.  She can not tolerate twice daily Multaq secondary to GI symptoms. She was intolerant to amiodarone in the past (neuropathic symptoms).\par She does get episodic waves of dizziness, that have correlated with her history of autonomic imbalance. These episodes all pass within minutes to less than one hour and she has not had any recent syncope. \par She also takes a diuretic two times per week or so, tailored to whether she has any peripheral edema. Currently, she says her ankles are flat.\par \par She has a UTI and states that if her antibiotic needs to be changed she needs to stop Multaq.  I have told her that if she starts this antibiotic she should stop the Multaq two days before starting then antibiotic and then when she is done with the antibiotic she can restart the multaq.\par \par Overall she is feeling well and I have told her she can go to [place] but she should wear a N95 mask on the plane.  She will follow up when she returns. She knows to call with any questions or concerns.  \par I spent 16  minutes with Ms. Javier concerning her medical follow-up.

## 2020-06-14 NOTE — ED PROVIDER NOTE - CPE EDP GASTRO NORM
"Pt contracts for safety, not actively suicidal right now, \"not depressed today\"  DEC consult started.  " normal...

## 2023-02-02 NOTE — ED PROVIDER NOTE - INTERPRETATION
[facility]  Progress Note    Anne Vasquez Patient Status:  Inpatient    1951 MRN [id number]   [facility] 6NE-A Attending Willie Weber MD   [facility] Day # 25 PCP Chad Amin MD       Assessment and Plan:  Patient Active Problem Esophagogastroduodenoscopy Procedure Note    Patient Name:  Marge Aguirre    Date of Procedure:  2/2/2023    Surgeon:  Kamaljit Wiggins MD    Primary Care Provider:  Ivelisse Lee surgery      Subjective:  No chest pain or shortness of breath.     Objective:  /82   Pulse 118   Temp 98.3 °F (36.8 °C) (Temporal)   Resp (!) 34   Ht 5' 3\" (1.6 m)   Wt 187 lb 9.8 oz (85.1 kg)   SpO2 98%   BMI 33.23 kg/m²     Temp (24hrs), Av.1 nebulizer solution 0.5 mg, 0.5 mg, Nebulization, TID    And  albuterol sulfate (VENTOLIN) (2.5 MG/3ML) 0.083% nebulizer solution 2.5 mg, 2.5 mg, Nebulization, TID  hydrALAzine HCl (APRESOLINE) tab 25 mg, 25 mg, Oral, BID  Metoprolol Succinate ER (Toprol XL MD  3/19/2020  9:30 AM normal sinus rhythm, Normal axis, Normal MI interval and QRS complex. There are no acute ischemic ST or T-wave changes.

## 2024-03-07 NOTE — ED PROVIDER NOTE - MUSCULOSKELETAL, MLM
The pt is a 23y y/o G 2  @ 39 weeks  edc   10/28/2020 dated by lmP  who presents to labor & delivery c/o  contractions  + fetal movement, NO BLEEDING, no leaking fluid  
1
Principal Discharge DX:	Febrile illness  
Spine appears normal, range of motion is not limited, no muscle or joint tenderness
Principal Discharge DX:	Febrile illness  Secondary Diagnosis:	Influenza in adult

## 2024-06-16 NOTE — ED ADULT TRIAGE NOTE - SOURCE OF INFORMATION
Problem: PAIN - ADULT  Goal: Verbalizes/displays adequate comfort level or baseline comfort level  Description: Interventions:  - Encourage patient to monitor pain and request assistance  - Assess pain using appropriate pain scale  - Administer analgesics based on type and severity of pain and evaluate response  - Implement non-pharmacological measures as appropriate and evaluate response  - Consider cultural and social influences on pain and pain management  - Notify physician/advanced practitioner if interventions unsuccessful or patient reports new pain  Outcome: Progressing     Problem: INFECTION - ADULT  Goal: Absence or prevention of progression during hospitalization  Description: INTERVENTIONS:  - Assess and monitor for signs and symptoms of infection  - Monitor lab/diagnostic results  - Monitor all insertion sites, i.e. indwelling lines, tubes, and drains  - Monitor endotracheal if appropriate and nasal secretions for changes in amount and color  - [place] appropriate cooling/warming therapies per order  - Administer medications as ordered  - Instruct and encourage patient and family to use good hand hygiene technique  - Identify and instruct in appropriate isolation precautions for identified infection/condition  Outcome: Progressing  Goal: Absence of fever/infection during neutropenic period  Description: INTERVENTIONS:  - Monitor WBC    Outcome: Progressing     Problem: SAFETY ADULT  Goal: Patient will remain free of falls  Description: INTERVENTIONS:  - Educate patient/family on patient safety including physical limitations  - Instruct patient to call for assistance with activity   - Consult OT/PT to assist with strengthening/mobility   - Keep Call bell within reach  - Keep bed low and locked with side rails adjusted as appropriate  - Keep care items and personal belongings within reach  - Initiate and maintain comfort rounds  - Make Fall Risk Sign visible to staff  - Offer Toileting every  Hours,  in advance of need  - Initiate/Maintain alarm  - Obtain necessary fall risk management equipment  - Apply yellow socks and bracelet for high fall risk patients  - Consider moving patient to room near nurses station  Outcome: Progressing  Goal: Maintain or return to baseline ADL function  Description: INTERVENTIONS:  -  Assess patient's ability to carry out ADLs; assess patient's baseline for ADL function and identify physical deficits which impact ability to perform ADLs (bathing, care of mouth/teeth, toileting, grooming, dressing, etc.)  - Assess/evaluate cause of self-care deficits   - Assess range of motion  - Assess patient's mobility; develop plan if impaired  - Assess patient's need for assistive devices and provide as appropriate  - Encourage maximum independence but intervene and supervise when necessary  - Involve family in performance of ADLs  - Assess for home care needs following discharge   - Consider OT consult to assist with ADL evaluation and planning for discharge  - Provide patient education as appropriate  Outcome: Progressing  Goal: Maintains/Returns to pre admission functional level  Description: INTERVENTIONS:  - Perform AM-PAC 6 Click Basic Mobility/ Daily Activity assessment daily.  - Set and communicate daily mobility goal to care team and patient/family/caregiver.   - Collaborate with rehabilitation services on mobility goals if consulted  - Perform Range of Motion  times a day.  - Reposition patient every  hours.  - Dangle patient  times a day  - Stand patient  times a day  - Ambulate patient  times a day  - Out of bed to chair  times a day   - Out of bed for meals  times a day  - Out of bed for toileting  - Record patient progress and toleration of activity level   Outcome: Progressing     Problem: DISCHARGE PLANNING  Goal: Discharge to home or other facility with appropriate resources  Description: INTERVENTIONS:  - Identify barriers to discharge w/patient and caregiver  - Arrange for  needed discharge resources and transportation as appropriate  - Identify discharge learning needs (meds, wound care, etc.)  - Arrange for interpretive services to assist at discharge as needed  - Refer to Case Management Department for coordinating discharge planning if the patient needs post-hospital services based on physician/advanced practitioner order or complex needs related to functional status, cognitive ability, or social support system  Outcome: Progressing     Problem: Knowledge Deficit  Goal: Patient/family/caregiver demonstrates understanding of disease process, treatment plan, medications, and discharge instructions  Description: Complete learning assessment and assess knowledge base.  Interventions:  - Provide teaching at level of understanding  - Provide teaching via preferred learning methods  Outcome: Progressing      Patient